# Patient Record
Sex: MALE | Race: WHITE | NOT HISPANIC OR LATINO | Employment: OTHER | URBAN - METROPOLITAN AREA
[De-identification: names, ages, dates, MRNs, and addresses within clinical notes are randomized per-mention and may not be internally consistent; named-entity substitution may affect disease eponyms.]

---

## 2020-08-07 ENCOUNTER — ANESTHESIA EVENT (OUTPATIENT)
Dept: SURGERY | Facility: HOSPITAL | Age: 75
DRG: 329 | End: 2020-08-07
Payer: MEDICARE

## 2020-08-07 ENCOUNTER — HOSPITAL ENCOUNTER (INPATIENT)
Facility: HOSPITAL | Age: 75
LOS: 6 days | Discharge: HOME | DRG: 329 | End: 2020-08-13
Attending: EMERGENCY MEDICINE | Admitting: SURGERY
Payer: MEDICARE

## 2020-08-07 ENCOUNTER — ANESTHESIA (OUTPATIENT)
Dept: SURGERY | Facility: HOSPITAL | Age: 75
DRG: 329 | End: 2020-08-07
Payer: MEDICARE

## 2020-08-07 DIAGNOSIS — K56.2 VOLVULUS OF SIGMOID COLON: Primary | ICD-10-CM

## 2020-08-07 DIAGNOSIS — Z01.818 PRE-OP TESTING: ICD-10-CM

## 2020-08-07 DIAGNOSIS — K56.699 DIVERTICULAR STRICTURE: ICD-10-CM

## 2020-08-07 DIAGNOSIS — Z93.3 COLOSTOMY IN PLACE: ICD-10-CM

## 2020-08-07 DIAGNOSIS — K57.92 DIVERTICULITIS: ICD-10-CM

## 2020-08-07 DIAGNOSIS — I10 ESSENTIAL HYPERTENSION: ICD-10-CM

## 2020-08-07 LAB
ABO + RH BLD: NORMAL
ALBUMIN SERPL BCP-MCNC: 4 G/DL (ref 3.5–5.2)
ALP SERPL-CCNC: 44 U/L (ref 55–135)
ALT SERPL W/O P-5'-P-CCNC: 13 U/L (ref 10–44)
ANION GAP SERPL CALC-SCNC: 11 MMOL/L (ref 8–16)
AST SERPL-CCNC: 27 U/L (ref 10–40)
BASOPHILS # BLD AUTO: 0.03 K/UL (ref 0–0.2)
BASOPHILS # BLD AUTO: 0.04 K/UL (ref 0–0.2)
BASOPHILS NFR BLD: 0.7 % (ref 0–1.9)
BASOPHILS NFR BLD: 0.7 % (ref 0–1.9)
BILIRUB SERPL-MCNC: 0.4 MG/DL (ref 0.1–1)
BILIRUB UR QL STRIP: NEGATIVE
BLD GP AB SCN CELLS X3 SERPL QL: NORMAL
BUN SERPL-MCNC: 9 MG/DL (ref 8–23)
CALCIUM SERPL-MCNC: 9.3 MG/DL (ref 8.7–10.5)
CHLORIDE SERPL-SCNC: 102 MMOL/L (ref 95–110)
CLARITY UR: CLEAR
CO2 SERPL-SCNC: 29 MMOL/L (ref 23–29)
COLOR UR: YELLOW
CREAT SERPL-MCNC: 0.9 MG/DL (ref 0.5–1.4)
DIFFERENTIAL METHOD: ABNORMAL
DIFFERENTIAL METHOD: ABNORMAL
EOSINOPHIL # BLD AUTO: 0.1 K/UL (ref 0–0.5)
EOSINOPHIL # BLD AUTO: 0.2 K/UL (ref 0–0.5)
EOSINOPHIL NFR BLD: 2.4 % (ref 0–8)
EOSINOPHIL NFR BLD: 2.6 % (ref 0–8)
ERYTHROCYTE [DISTWIDTH] IN BLOOD BY AUTOMATED COUNT: 14.1 % (ref 11.5–14.5)
ERYTHROCYTE [DISTWIDTH] IN BLOOD BY AUTOMATED COUNT: 14.3 % (ref 11.5–14.5)
EST. GFR  (AFRICAN AMERICAN): >60 ML/MIN/1.73 M^2
EST. GFR  (NON AFRICAN AMERICAN): >60 ML/MIN/1.73 M^2
GLUCOSE SERPL-MCNC: 99 MG/DL (ref 70–110)
GLUCOSE UR QL STRIP: NEGATIVE
HCT VFR BLD AUTO: 33.1 % (ref 40–54)
HCT VFR BLD AUTO: 35.7 % (ref 40–54)
HGB BLD-MCNC: 10.2 G/DL (ref 14–18)
HGB BLD-MCNC: 10.8 G/DL (ref 14–18)
HGB UR QL STRIP: ABNORMAL
IMM GRANULOCYTES # BLD AUTO: 0 K/UL (ref 0–0.04)
IMM GRANULOCYTES # BLD AUTO: 0.02 K/UL (ref 0–0.04)
IMM GRANULOCYTES NFR BLD AUTO: 0 % (ref 0–0.5)
IMM GRANULOCYTES NFR BLD AUTO: 0.3 % (ref 0–0.5)
KETONES UR QL STRIP: ABNORMAL
LEUKOCYTE ESTERASE UR QL STRIP: NEGATIVE
LIPASE SERPL-CCNC: 25 U/L (ref 4–60)
LYMPHOCYTES # BLD AUTO: 0.8 K/UL (ref 1–4.8)
LYMPHOCYTES # BLD AUTO: 0.8 K/UL (ref 1–4.8)
LYMPHOCYTES NFR BLD: 13.1 % (ref 18–48)
LYMPHOCYTES NFR BLD: 19.5 % (ref 18–48)
MCH RBC QN AUTO: 29.2 PG (ref 27–31)
MCH RBC QN AUTO: 29.4 PG (ref 27–31)
MCHC RBC AUTO-ENTMCNC: 30.3 G/DL (ref 32–36)
MCHC RBC AUTO-ENTMCNC: 30.8 G/DL (ref 32–36)
MCV RBC AUTO: 95 FL (ref 82–98)
MCV RBC AUTO: 97 FL (ref 82–98)
MONOCYTES # BLD AUTO: 0.1 K/UL (ref 0.3–1)
MONOCYTES # BLD AUTO: 0.3 K/UL (ref 0.3–1)
MONOCYTES NFR BLD: 2.7 % (ref 4–15)
MONOCYTES NFR BLD: 5.5 % (ref 4–15)
NEUTROPHILS # BLD AUTO: 3.1 K/UL (ref 1.8–7.7)
NEUTROPHILS # BLD AUTO: 4.6 K/UL (ref 1.8–7.7)
NEUTROPHILS NFR BLD: 74.7 % (ref 38–73)
NEUTROPHILS NFR BLD: 77.8 % (ref 38–73)
NITRITE UR QL STRIP: NEGATIVE
NRBC BLD-RTO: 0 /100 WBC
NRBC BLD-RTO: 0 /100 WBC
PH UR STRIP: >8 [PH] (ref 5–8)
PLATELET # BLD AUTO: 190 K/UL (ref 150–350)
PLATELET # BLD AUTO: 222 K/UL (ref 150–350)
PMV BLD AUTO: 10 FL (ref 9.2–12.9)
PMV BLD AUTO: 9.9 FL (ref 9.2–12.9)
POTASSIUM SERPL-SCNC: 3.5 MMOL/L (ref 3.5–5.1)
PROT SERPL-MCNC: 7.3 G/DL (ref 6–8.4)
PROT UR QL STRIP: ABNORMAL
RBC # BLD AUTO: 3.49 M/UL (ref 4.6–6.2)
RBC # BLD AUTO: 3.67 M/UL (ref 4.6–6.2)
SARS-COV-2 RDRP RESP QL NAA+PROBE: NEGATIVE
SODIUM SERPL-SCNC: 142 MMOL/L (ref 136–145)
SP GR UR STRIP: 1.01 (ref 1–1.03)
URN SPEC COLLECT METH UR: ABNORMAL
UROBILINOGEN UR STRIP-ACNC: NEGATIVE EU/DL
WBC # BLD AUTO: 4.1 K/UL (ref 3.9–12.7)
WBC # BLD AUTO: 5.86 K/UL (ref 3.9–12.7)

## 2020-08-07 PROCEDURE — 25000003 PHARM REV CODE 250: Performed by: EMERGENCY MEDICINE

## 2020-08-07 PROCEDURE — 36000708 HC OR TIME LEV III 1ST 15 MIN: Performed by: SURGERY

## 2020-08-07 PROCEDURE — 11000001 HC ACUTE MED/SURG PRIVATE ROOM

## 2020-08-07 PROCEDURE — 81003 URINALYSIS AUTO W/O SCOPE: CPT

## 2020-08-07 PROCEDURE — 25000003 PHARM REV CODE 250: Performed by: NURSE ANESTHETIST, CERTIFIED REGISTERED

## 2020-08-07 PROCEDURE — 96375 TX/PRO/DX INJ NEW DRUG ADDON: CPT

## 2020-08-07 PROCEDURE — 88307 TISSUE EXAM BY PATHOLOGIST: CPT | Performed by: PATHOLOGY

## 2020-08-07 PROCEDURE — 71000033 HC RECOVERY, INTIAL HOUR: Performed by: SURGERY

## 2020-08-07 PROCEDURE — 44139 MOBILIZATION OF COLON: CPT | Mod: ,,, | Performed by: SURGERY

## 2020-08-07 PROCEDURE — 25500020 PHARM REV CODE 255: Performed by: EMERGENCY MEDICINE

## 2020-08-07 PROCEDURE — C9290 INJ, BUPIVACAINE LIPOSOME: HCPCS | Performed by: SURGERY

## 2020-08-07 PROCEDURE — 88307 PR  SURG PATH,LEVEL V: ICD-10-PCS | Mod: 26,,, | Performed by: PATHOLOGY

## 2020-08-07 PROCEDURE — 83690 ASSAY OF LIPASE: CPT

## 2020-08-07 PROCEDURE — 63600175 PHARM REV CODE 636 W HCPCS: Performed by: SURGERY

## 2020-08-07 PROCEDURE — 93005 ELECTROCARDIOGRAM TRACING: CPT

## 2020-08-07 PROCEDURE — 85025 COMPLETE CBC W/AUTO DIFF WBC: CPT | Mod: 91

## 2020-08-07 PROCEDURE — 36415 COLL VENOUS BLD VENIPUNCTURE: CPT

## 2020-08-07 PROCEDURE — 63600175 PHARM REV CODE 636 W HCPCS: Performed by: ANESTHESIOLOGY

## 2020-08-07 PROCEDURE — 99285 EMERGENCY DEPT VISIT HI MDM: CPT | Mod: 25

## 2020-08-07 PROCEDURE — 88307 TISSUE EXAM BY PATHOLOGIST: CPT | Mod: 26,,, | Performed by: PATHOLOGY

## 2020-08-07 PROCEDURE — 44143 PARTIAL REMOVAL OF COLON: CPT | Mod: ,,, | Performed by: SURGERY

## 2020-08-07 PROCEDURE — 93010 ELECTROCARDIOGRAM REPORT: CPT | Mod: ,,, | Performed by: INTERNAL MEDICINE

## 2020-08-07 PROCEDURE — U0002 COVID-19 LAB TEST NON-CDC: HCPCS

## 2020-08-07 PROCEDURE — 99223 PR INITIAL HOSPITAL CARE,LEVL III: ICD-10-PCS | Mod: 57,AI,, | Performed by: SURGERY

## 2020-08-07 PROCEDURE — 63600175 PHARM REV CODE 636 W HCPCS: Performed by: NURSE ANESTHETIST, CERTIFIED REGISTERED

## 2020-08-07 PROCEDURE — 27201423 OPTIME MED/SURG SUP & DEVICES STERILE SUPPLY: Performed by: SURGERY

## 2020-08-07 PROCEDURE — 37000008 HC ANESTHESIA 1ST 15 MINUTES: Performed by: SURGERY

## 2020-08-07 PROCEDURE — C1765 ADHESION BARRIER: HCPCS | Performed by: SURGERY

## 2020-08-07 PROCEDURE — 25000003 PHARM REV CODE 250: Performed by: SURGERY

## 2020-08-07 PROCEDURE — 86901 BLOOD TYPING SEROLOGIC RH(D): CPT

## 2020-08-07 PROCEDURE — 86920 COMPATIBILITY TEST SPIN: CPT

## 2020-08-07 PROCEDURE — 96374 THER/PROPH/DIAG INJ IV PUSH: CPT

## 2020-08-07 PROCEDURE — 44139 PR MOBILIZE SPLENIC FLEX: ICD-10-PCS | Mod: ,,, | Performed by: SURGERY

## 2020-08-07 PROCEDURE — 99223 1ST HOSP IP/OBS HIGH 75: CPT | Mod: 57,AI,, | Performed by: SURGERY

## 2020-08-07 PROCEDURE — 93010 EKG 12-LEAD: ICD-10-PCS | Mod: ,,, | Performed by: INTERNAL MEDICINE

## 2020-08-07 PROCEDURE — 36000709 HC OR TIME LEV III EA ADD 15 MIN: Performed by: SURGERY

## 2020-08-07 PROCEDURE — 80053 COMPREHEN METABOLIC PANEL: CPT

## 2020-08-07 PROCEDURE — S0030 INJECTION, METRONIDAZOLE: HCPCS | Performed by: EMERGENCY MEDICINE

## 2020-08-07 PROCEDURE — 37000009 HC ANESTHESIA EA ADD 15 MINS: Performed by: SURGERY

## 2020-08-07 PROCEDURE — 63600175 PHARM REV CODE 636 W HCPCS: Performed by: EMERGENCY MEDICINE

## 2020-08-07 PROCEDURE — 44143 PR PART REMOVAL COLON W END COLOSTOMY: ICD-10-PCS | Mod: ,,, | Performed by: SURGERY

## 2020-08-07 DEVICE — MEMBRANE SEPRAFILM 5 X 6: Type: IMPLANTABLE DEVICE | Site: ABDOMEN | Status: FUNCTIONAL

## 2020-08-07 RX ORDER — DEXAMETHASONE SODIUM PHOSPHATE 4 MG/ML
INJECTION, SOLUTION INTRA-ARTICULAR; INTRALESIONAL; INTRAMUSCULAR; INTRAVENOUS; SOFT TISSUE
Status: DISCONTINUED | OUTPATIENT
Start: 2020-08-07 | End: 2020-08-07

## 2020-08-07 RX ORDER — OXYCODONE AND ACETAMINOPHEN 10; 325 MG/1; MG/1
TABLET ORAL
Status: ON HOLD | COMMUNITY
Start: 2020-07-24 | End: 2020-08-13 | Stop reason: HOSPADM

## 2020-08-07 RX ORDER — CIPROFLOXACIN 2 MG/ML
400 INJECTION, SOLUTION INTRAVENOUS
Status: COMPLETED | OUTPATIENT
Start: 2020-08-07 | End: 2020-08-07

## 2020-08-07 RX ORDER — SUCCINYLCHOLINE CHLORIDE 20 MG/ML
INJECTION INTRAMUSCULAR; INTRAVENOUS
Status: DISCONTINUED | OUTPATIENT
Start: 2020-08-07 | End: 2020-08-07

## 2020-08-07 RX ORDER — ROCURONIUM BROMIDE 10 MG/ML
INJECTION, SOLUTION INTRAVENOUS
Status: DISCONTINUED | OUTPATIENT
Start: 2020-08-07 | End: 2020-08-07

## 2020-08-07 RX ORDER — ONDANSETRON 2 MG/ML
4 INJECTION INTRAMUSCULAR; INTRAVENOUS DAILY PRN
Status: DISCONTINUED | OUTPATIENT
Start: 2020-08-07 | End: 2020-08-08 | Stop reason: HOSPADM

## 2020-08-07 RX ORDER — TIZANIDINE 4 MG/1
TABLET ORAL
Status: ON HOLD | COMMUNITY
Start: 2020-07-24 | End: 2020-08-13 | Stop reason: HOSPADM

## 2020-08-07 RX ORDER — ONDANSETRON 2 MG/ML
INJECTION INTRAMUSCULAR; INTRAVENOUS
Status: DISCONTINUED | OUTPATIENT
Start: 2020-08-07 | End: 2020-08-07

## 2020-08-07 RX ORDER — FENTANYL CITRATE 50 UG/ML
INJECTION, SOLUTION INTRAMUSCULAR; INTRAVENOUS
Status: DISCONTINUED | OUTPATIENT
Start: 2020-08-07 | End: 2020-08-07

## 2020-08-07 RX ORDER — HYDROMORPHONE HYDROCHLORIDE 2 MG/ML
0.2 INJECTION, SOLUTION INTRAMUSCULAR; INTRAVENOUS; SUBCUTANEOUS EVERY 5 MIN PRN
Status: DISCONTINUED | OUTPATIENT
Start: 2020-08-07 | End: 2020-08-08 | Stop reason: HOSPADM

## 2020-08-07 RX ORDER — METRONIDAZOLE 500 MG/100ML
500 INJECTION, SOLUTION INTRAVENOUS
Status: COMPLETED | OUTPATIENT
Start: 2020-08-07 | End: 2020-08-07

## 2020-08-07 RX ORDER — MORPHINE SULFATE 4 MG/ML
2 INJECTION, SOLUTION INTRAMUSCULAR; INTRAVENOUS
Status: COMPLETED | OUTPATIENT
Start: 2020-08-07 | End: 2020-08-07

## 2020-08-07 RX ORDER — KETOROLAC TROMETHAMINE 30 MG/ML
10 INJECTION, SOLUTION INTRAMUSCULAR; INTRAVENOUS
Status: COMPLETED | OUTPATIENT
Start: 2020-08-07 | End: 2020-08-07

## 2020-08-07 RX ORDER — METRONIDAZOLE 500 MG/100ML
500 INJECTION, SOLUTION INTRAVENOUS ONCE
Status: CANCELLED | OUTPATIENT
Start: 2020-08-07 | End: 2020-08-07

## 2020-08-07 RX ORDER — ONDANSETRON 2 MG/ML
4 INJECTION INTRAMUSCULAR; INTRAVENOUS
Status: COMPLETED | OUTPATIENT
Start: 2020-08-07 | End: 2020-08-07

## 2020-08-07 RX ORDER — NEOSTIGMINE METHYLSULFATE 1 MG/ML
INJECTION, SOLUTION INTRAVENOUS
Status: DISCONTINUED | OUTPATIENT
Start: 2020-08-07 | End: 2020-08-07

## 2020-08-07 RX ORDER — GLYCOPYRROLATE 0.2 MG/ML
INJECTION INTRAMUSCULAR; INTRAVENOUS
Status: DISCONTINUED | OUTPATIENT
Start: 2020-08-07 | End: 2020-08-07

## 2020-08-07 RX ORDER — LIDOCAINE HYDROCHLORIDE 10 MG/ML
INJECTION, SOLUTION EPIDURAL; INFILTRATION; INTRACAUDAL; PERINEURAL
Status: DISCONTINUED | OUTPATIENT
Start: 2020-08-07 | End: 2020-08-07

## 2020-08-07 RX ORDER — GABAPENTIN 300 MG/1
CAPSULE ORAL
Status: ON HOLD | COMMUNITY
Start: 2020-07-24 | End: 2020-08-13 | Stop reason: HOSPADM

## 2020-08-07 RX ORDER — SODIUM CHLORIDE, SODIUM LACTATE, POTASSIUM CHLORIDE, CALCIUM CHLORIDE 600; 310; 30; 20 MG/100ML; MG/100ML; MG/100ML; MG/100ML
INJECTION, SOLUTION INTRAVENOUS CONTINUOUS PRN
Status: DISCONTINUED | OUTPATIENT
Start: 2020-08-07 | End: 2020-08-07

## 2020-08-07 RX ORDER — ALENDRONATE SODIUM 70 MG/1
TABLET ORAL
COMMUNITY
Start: 2013-04-18

## 2020-08-07 RX ORDER — PROPOFOL 10 MG/ML
VIAL (ML) INTRAVENOUS
Status: DISCONTINUED | OUTPATIENT
Start: 2020-08-07 | End: 2020-08-07

## 2020-08-07 RX ORDER — OMEPRAZOLE 40 MG/1
40 CAPSULE, DELAYED RELEASE ORAL
Status: ON HOLD | COMMUNITY
Start: 2013-04-23 | End: 2020-08-13 | Stop reason: HOSPADM

## 2020-08-07 RX ORDER — BUPIVACAINE HYDROCHLORIDE 2.5 MG/ML
INJECTION, SOLUTION EPIDURAL; INFILTRATION; INTRACAUDAL
Status: DISCONTINUED | OUTPATIENT
Start: 2020-08-07 | End: 2020-08-07 | Stop reason: HOSPADM

## 2020-08-07 RX ADMIN — ONDANSETRON 4 MG: 2 INJECTION INTRAMUSCULAR; INTRAVENOUS at 05:08

## 2020-08-07 RX ADMIN — LIDOCAINE HYDROCHLORIDE 50 MG: 10 INJECTION, SOLUTION EPIDURAL; INFILTRATION; INTRACAUDAL; PERINEURAL at 09:08

## 2020-08-07 RX ADMIN — ONDANSETRON 4 MG: 2 INJECTION, SOLUTION INTRAMUSCULAR; INTRAVENOUS at 09:08

## 2020-08-07 RX ADMIN — FENTANYL CITRATE 50 MCG: 50 INJECTION, SOLUTION INTRAMUSCULAR; INTRAVENOUS at 09:08

## 2020-08-07 RX ADMIN — MORPHINE SULFATE 2 MG: 4 INJECTION, SOLUTION INTRAMUSCULAR; INTRAVENOUS at 05:08

## 2020-08-07 RX ADMIN — KETOROLAC TROMETHAMINE 10 MG: 30 INJECTION, SOLUTION INTRAMUSCULAR at 04:08

## 2020-08-07 RX ADMIN — IOHEXOL 100 ML: 350 INJECTION, SOLUTION INTRAVENOUS at 04:08

## 2020-08-07 RX ADMIN — HYDROMORPHONE HYDROCHLORIDE 0.2 MG: 2 INJECTION INTRAMUSCULAR; INTRAVENOUS; SUBCUTANEOUS at 11:08

## 2020-08-07 RX ADMIN — ROBINUL 0.2 MG: 0.2 INJECTION INTRAMUSCULAR; INTRAVENOUS at 09:08

## 2020-08-07 RX ADMIN — CIPROFLOXACIN 400 MG: 2 SOLUTION, CONCENTRATE INTRAVENOUS at 09:08

## 2020-08-07 RX ADMIN — SUCCINYLCHOLINE CHLORIDE 120 MG: 20 INJECTION, SOLUTION INTRAMUSCULAR; INTRAVENOUS at 09:08

## 2020-08-07 RX ADMIN — BUPIVACAINE 266 MG: 13.3 INJECTION, SUSPENSION, LIPOSOMAL INFILTRATION at 10:08

## 2020-08-07 RX ADMIN — SODIUM CHLORIDE, SODIUM LACTATE, POTASSIUM CHLORIDE, AND CALCIUM CHLORIDE: .6; .31; .03; .02 INJECTION, SOLUTION INTRAVENOUS at 08:08

## 2020-08-07 RX ADMIN — PROPOFOL 100 MG: 10 INJECTION, EMULSION INTRAVENOUS at 09:08

## 2020-08-07 RX ADMIN — ROBINUL 0.8 MG: 0.2 INJECTION INTRAMUSCULAR; INTRAVENOUS at 10:08

## 2020-08-07 RX ADMIN — DEXAMETHASONE SODIUM PHOSPHATE 4 MG: 4 INJECTION, SOLUTION INTRA-ARTICULAR; INTRALESIONAL; INTRAMUSCULAR; INTRAVENOUS; SOFT TISSUE at 09:08

## 2020-08-07 RX ADMIN — NEOSTIGMINE METHYLSULFATE 4 MG: 1 INJECTION INTRAVENOUS at 10:08

## 2020-08-07 RX ADMIN — SODIUM CHLORIDE, SODIUM LACTATE, POTASSIUM CHLORIDE, AND CALCIUM CHLORIDE: .6; .31; .03; .02 INJECTION, SOLUTION INTRAVENOUS at 10:08

## 2020-08-07 RX ADMIN — ROCURONIUM BROMIDE 10 MG: 10 INJECTION, SOLUTION INTRAVENOUS at 09:08

## 2020-08-07 RX ADMIN — METRONIDAZOLE 500 MG: 500 INJECTION, SOLUTION INTRAVENOUS at 07:08

## 2020-08-07 RX ADMIN — FENTANYL CITRATE 100 MCG: 50 INJECTION, SOLUTION INTRAMUSCULAR; INTRAVENOUS at 10:08

## 2020-08-07 NOTE — ED PROVIDER NOTES
"SCRIBE #1 NOTE: I, Lisa Kang, am scribing for, and in the presence of, Bjorn Macedo MD. I have scribed the entire note.       History     Chief Complaint   Patient presents with    Abdominal Pain     worsening abd cramping after a colooscopy and barium enema     Review of patient's allergies indicates:  No Known Allergies      History of Present Illness     HPI    8/7/2020, 2:23 PM  History obtained from the patient      History of Present Illness: Henrique Vargas Jr. is a 74 y.o. male patient with a h/o diverticulitis, prostate cancer, who presents to the Emergency Department for evaluation of LLQ abd pain which onset this morning. Pt reports that he has had abd cramping following an XR barium enema done this morning at [facility]. Symptoms are constant and "8/10" in severity. No mitigating or exacerbating factors reported. Associated sxs include abd cramping and nausea. Patient denies any fever, emesis, diarrhea, SOB, dysuria, and all other sxs at this time. No prior Tx reported. No further complaints or concerns at this time.       Arrival mode: Personal transportation      PCP: Bjorn Warner MD      Past Medical History:  Past Medical History:   Diagnosis Date    Diverticulitis     Prostate cancer        Past Surgical History:  Past Surgical History:   Procedure Laterality Date    APPENDECTOMY      BACK SURGERY      PROSTATECTOMY           Family History:  History reviewed. No pertinent family history.    Social History:   Social History     Tobacco Use    Smoking status: Never Smoker   Substance and Sexual Activity    Alcohol use: No    Drug use: No    Sexual activity: Unknown        Review of Systems     Review of Systems   Constitutional: Negative for fever.   HENT: Negative for sore throat.    Respiratory: Negative for shortness of breath.    Cardiovascular: Negative for chest pain.   Gastrointestinal: Positive for abdominal pain (LLQ) and nausea. Negative for diarrhea " "and vomiting.        (+) abd cramping   Genitourinary: Negative for dysuria.   Musculoskeletal: Negative for back pain.   Skin: Negative for rash.   Neurological: Negative for headaches.   Hematological: Does not bruise/bleed easily.   All other systems reviewed and are negative.       Physical Exam     Initial Vitals [08/07/20 1413]   BP Pulse Resp Temp SpO2   (!) 175/85 (!) 49 18 97.7 °F (36.5 °C) 95 %      MAP       --          Physical Exam  Nursing Notes and Vital Signs Reviewed.  Constitutional: Well-developed and well-nourished.   Head: Atraumatic. Normocephalic.  Eyes: EOM intact. No scleral icterus.  ENT: Mucous membranes are moist. Oropharynx is clear and symmetric.    Neck: Supple. Full ROM. No lymphadenopathy.  Cardiovascular: Bradycardic. Regular rhythm. No murmurs, rubs, or gallops. Distal pulses are 2+ and symmetric.  Pulmonary/Chest: No respiratory distress. Clear to auscultation bilaterally. No wheezing or rales.  Abdominal: Soft and non-distended. Mild LLQ tenderness.  No rebound, guarding, or rigidity. Good bowel sounds.  Genitourinary: No CVA tenderness  Musculoskeletal: Moves all extremities. No obvious deformities. No calf tenderness.  Skin: Warm and dry.  Neurological:  Alert, awake, and appropriate.  Normal speech.  No acute focal neurological deficits are appreciated.  Psychiatric: Normal affect. Good eye contact. Appropriate in content.     ED Course   Procedures  ED Vital Signs:  Vitals:    08/07/20 1413 08/07/20 1430 08/07/20 1530 08/07/20 1605   BP: (!) 175/85 (!) 153/74 (!) 149/67 (!) 156/73   Pulse: (!) 49 (!) 42 (!) 40 (!) 39   Resp: 18 16 16 16   Temp: 97.7 °F (36.5 °C)      TempSrc: Oral      SpO2: 95% 96% 97% 98%   Weight: 59.9 kg (132 lb 0.9 oz)      Height: 5' 8" (1.727 m)       08/07/20 1730 08/07/20 1743 08/07/20 1903   BP: (!) 156/67  119/62   Pulse: (!) 43  (!) 38   Resp: 16 16 14   Temp:      TempSrc:      SpO2: 97%  95%   Weight:      Height:          Abnormal Lab " Results:  Labs Reviewed   CBC W/ AUTO DIFFERENTIAL - Abnormal; Notable for the following components:       Result Value    RBC 3.49 (*)     Hemoglobin 10.2 (*)     Hematocrit 33.1 (*)     Mean Corpuscular Hemoglobin Conc 30.8 (*)     Lymph # 0.8 (*)     Gran% 77.8 (*)     Lymph% 13.1 (*)     All other components within normal limits   COMPREHENSIVE METABOLIC PANEL - Abnormal; Notable for the following components:    Alkaline Phosphatase 44 (*)     All other components within normal limits   URINALYSIS, REFLEX TO URINE CULTURE - Abnormal; Notable for the following components:    pH, UA >8.0 (*)     Protein, UA Trace (*)     Ketones, UA 1+ (*)     Occult Blood UA Trace (*)     All other components within normal limits    Narrative:     Specimen Source->Urine   LIPASE   SARS-COV-2 RNA AMPLIFICATION, QUAL   TYPE & SCREEN        All Lab Results:  Results for orders placed or performed during the hospital encounter of 08/07/20   CBC W/ AUTO DIFFERENTIAL   Result Value Ref Range    WBC 5.86 3.90 - 12.70 K/uL    RBC 3.49 (L) 4.60 - 6.20 M/uL    Hemoglobin 10.2 (L) 14.0 - 18.0 g/dL    Hematocrit 33.1 (L) 40.0 - 54.0 %    Mean Corpuscular Volume 95 82 - 98 fL    Mean Corpuscular Hemoglobin 29.2 27.0 - 31.0 pg    Mean Corpuscular Hemoglobin Conc 30.8 (L) 32.0 - 36.0 g/dL    RDW 14.1 11.5 - 14.5 %    Platelets 190 150 - 350 K/uL    MPV 9.9 9.2 - 12.9 fL    Immature Granulocytes 0.3 0.0 - 0.5 %    Gran # (ANC) 4.6 1.8 - 7.7 K/uL    Immature Grans (Abs) 0.02 0.00 - 0.04 K/uL    Lymph # 0.8 (L) 1.0 - 4.8 K/uL    Mono # 0.3 0.3 - 1.0 K/uL    Eos # 0.2 0.0 - 0.5 K/uL    Baso # 0.04 0.00 - 0.20 K/uL    nRBC 0 0 /100 WBC    Gran% 77.8 (H) 38.0 - 73.0 %    Lymph% 13.1 (L) 18.0 - 48.0 %    Mono% 5.5 4.0 - 15.0 %    Eosinophil% 2.6 0.0 - 8.0 %    Basophil% 0.7 0.0 - 1.9 %    Differential Method Automated    Comp. Metabolic Panel   Result Value Ref Range    Sodium 142 136 - 145 mmol/L    Potassium 3.5 3.5 - 5.1 mmol/L    Chloride 102 95 -  110 mmol/L    CO2 29 23 - 29 mmol/L    Glucose 99 70 - 110 mg/dL    BUN, Bld 9 8 - 23 mg/dL    Creatinine 0.9 0.5 - 1.4 mg/dL    Calcium 9.3 8.7 - 10.5 mg/dL    Total Protein 7.3 6.0 - 8.4 g/dL    Albumin 4.0 3.5 - 5.2 g/dL    Total Bilirubin 0.4 0.1 - 1.0 mg/dL    Alkaline Phosphatase 44 (L) 55 - 135 U/L    AST 27 10 - 40 U/L    ALT 13 10 - 44 U/L    Anion Gap 11 8 - 16 mmol/L    eGFR if African American >60 >60 mL/min/1.73 m^2    eGFR if non African American >60 >60 mL/min/1.73 m^2   Lipase   Result Value Ref Range    Lipase 25 4 - 60 U/L   Urinalysis, Reflex to Urine Culture Urine, Clean Catch    Specimen: Urine   Result Value Ref Range    Specimen UA Urine, Clean Catch     Color, UA Yellow Yellow, Straw, Isa    Appearance, UA Clear Clear    pH, UA >8.0 (A) 5.0 - 8.0    Specific Gravity, UA 1.010 1.005 - 1.030    Protein, UA Trace (A) Negative    Glucose, UA Negative Negative    Ketones, UA 1+ (A) Negative    Bilirubin (UA) Negative Negative    Occult Blood UA Trace (A) Negative    Nitrite, UA Negative Negative    Urobilinogen, UA Negative <2.0 EU/dL    Leukocytes, UA Negative Negative         Imaging Results          CT Abdomen Pelvis With Contrast (Final result)  Result time 08/07/20 17:00:38    Final result by Onofre Leonard MD (08/07/20 17:00:38)                 Impression:      There is partial twisting of the sigmoid mesentery at the left lower quadrant suggesting early or developing sigmoid volvulus. There is also wall thickening, luminal narrowing, and mild inflammation of the lower sigmoid colon distal to the twisting, suggesting mild to moderate diverticulitis (comparison with prior study of 10/09/2015 suggests this may be a chronic or recurrent diverticulitis).  Whether the gaseous distension of the right colon, transverse colon, and descending colon is related to the partial twisting of the sigmoid mesentery, or to the sigmoid diverticulitis, is uncertain.  No abscess or free air.  No  pneumatosis.    This critical information above was relayed by myself by telephone to Dr. Macedo on 08/07/2020 at 16:52.      Electronically signed by: Onofre Leonard  Date:    08/07/2020  Time:    17:00             Narrative:    EXAMINATION:  CT ABDOMEN PELVIS WITH CONTRAST    CLINICAL HISTORY:  Abdominal pain, acute, nonlocalized;    TECHNIQUE:  Low dose axial images, sagittal and coronal reformations were obtained from the lung bases to the pubic symphysis after  mL Omnipaque 350. Oral Omnipaque 350 also administered.    All CT scans at this location are performed using dose modulation techniques as appropriate to a performed exam including the following: Automated exposure control; adjustment of the mA and/or kV according to patient size.    COMPARISON:  10/09/2015 CT abdomen pelvis    FINDINGS:  Heart: Normal in size. No pericardial effusion.    Lung Bases: Well aerated, without consolidation or pleural fluid.    Liver: Normal in size and attenuation, with no focal hepatic lesions.    Gallbladder: No calcified gallstones.    Bile Ducts: No evidence of dilated ducts.    Pancreas: Mild pancreatic atrophy.    Spleen: Unremarkable.    Adrenals: Unremarkable.    Kidneys/ Ureters: Nonobstructing left upper pole 3 mm renal stone.  No hydronephrosis.    Bladder: No evidence of wall thickening.    Reproductive organs: Evidence of prior prostatectomy.  Surgical clips in the pelvis.  Small volume free fluid also seen in the pelvis.    GI Tract/Mesentery: Large hiatal hernia containing the body and fundus of the stomach.  Duodenal diverticulum again noted..  Small bowel unremarkable.  Normal transit of oral contrast.  Gaseous distention of the right colon, transverse colon, descending colon up to 6.8 cm.  There is partial twisting of the sigmoid mesentery at the left lower quadrant suggesting early or developing sigmoid volvulus.  There is also wall thickening and mild inflammation of the sigmoid colon suggesting  mild to moderate diverticulitis, with mild luminal narrowing.  Whether the colonic distension is related to the partial twisting of the sigmoid mesentery or the sigmoid diverticulitis is uncertain.  No abscess or free air.  No pneumatosis.    Appendix: Appendix is not well visualized.  No evidence of appendicitis.    Peritoneal Space: No free air.    Retroperitoneum: No significant adenopathy.    Abdominal wall: Unremarkable.    Vasculature: Aortoiliac  atherosclerotic calcification. No aneurysm.    Bones: Extensive thoracolumbar fixation hardware.  Lumbar neurostimulator device.  Bones appear osteopenic.                                   The Emergency Provider reviewed the vital signs and test results, which are outlined above.     ED Discussion     5:15 PM: Discussed pt's case with Dr. Bee (Gastroenterology) who recommends pt have GI consult at [facility] and does not think pt needs to be transferred.    5:39 PM: Discussed pt's case with Dr. Tiwari (Gastroenterology) who recommends consulting surgery because she does not want to scope secondary to diverticulitis.     5:55 PM: Discussed pt's case with Dr. Will (General Surgery) who states that he will come see pt and reports that pt is going to need a decompressive colonoscopy by gastroenterology or surgery.    6:33 PM: Discussed case with Dr. Will (General Surgery). Dr. Will agrees with current care and management of pt and accepts admission.   Admitting Service: General Surgery  Admit to: Inpatient    Re-evaluated pt. I have discussed test results, shared treatment plan, and the need for admission with patient and family at bedside. Pt and family express understanding at this time and agree with all information. All questions answered. Pt and family have no further questions or concerns at this time. Pt is ready for admit.       MDM        Medical Decision Making:   Clinical Tests:   Lab Tests: Ordered and Reviewed  Radiological Study: Ordered  and Reviewed           ED Medication(s):  Medications   ciprofloxacin (CIPRO)400mg/200ml D5W IVPB 400 mg (0 mg Intravenous Paused 8/7/20 1918)   metronidazole IVPB 500 mg (500 mg Intravenous New Bag 8/7/20 1919)   iohexoL (OMNIPAQUE 9) oral solution 500 mL (500 mLs Oral Given 8/7/20 1625)   iohexoL (OMNIPAQUE 350) injection 100 mL (100 mLs Intravenous Given 8/7/20 1622)   ketorolac injection 9.999 mg (9.999 mg Intravenous Given 8/7/20 1639)   morphine injection 2 mg (2 mg Intravenous Given 8/7/20 1743)   ondansetron injection 4 mg (4 mg Intravenous Given 8/7/20 1740)       New Prescriptions    No medications on file               Scribe Attestation:   Scribe #1: I performed the above scribed service and the documentation accurately describes the services I performed. I attest to the accuracy of the note.     Attending:   Physician Attestation Statement for Scribe #1: I, Bjorn Macedo MD, personally performed the services described in this documentation, as scribed by Lisa Kang, in my presence, and it is both accurate and complete.           Clinical Impression       ICD-10-CM ICD-9-CM   1. Volvulus of sigmoid colon  K56.2 560.2   2. Pre-op testing  Z01.818 V72.84   3. Diverticulitis  K57.92 562.11       Disposition:   Disposition: Admitted  Condition: Fair         Bjorn Macedo MD  08/07/20 1953

## 2020-08-07 NOTE — ED NOTES
Pt c/o left sided abdominal pain/cramping following colonoscopy this morning. Pain rated 9/10. Denies fever, n/v, CP, SOB, HA, numbness, tingling, vision changes, bloody stool or any other symptoms at this time.     Patient identifiers verified and correct for Henrique Vargas Jr..    LOC: The patient is awake, alert and aware of environment with an appropriate affect, the patient is oriented x 3 and speaking appropriately.  APPEARANCE: Patient resting comfortably and in no acute distress, patient is clean and well groomed, patient's clothing is properly fastened.  SKIN: The skin is warm and dry, color consistent with ethnicity, patient has normal skin turgor and moist mucus membranes, skin intact, no breakdown or bruising noted.  MUSCULOSKELETAL: Patient moving all extremities spontaneously, generalized weakness.   RESPIRATORY: Airway is open and patent, respirations are spontaneous.  CARDIAC: Patient has a bradycardic - baseline, no peripheral edema noted, capillary refill < 3 seconds.  ABDOMEN: Soft and non tender to palpation.

## 2020-08-07 NOTE — ED NOTES
Brought patient to restroom, he was unable to provide urine sample at this time. Urinal at bedside.      Genevieve Stafford, Patient Care Assistant  08/07/20 0830

## 2020-08-07 NOTE — ED NOTES
Pt AAOx3, resting in bed, side rails up x 2, call bell within reach. NAD at this time. C/o 8/10 abdominal pain. MD notified. Will continue to monitor.

## 2020-08-08 PROBLEM — I10 ESSENTIAL HYPERTENSION: Status: ACTIVE | Noted: 2020-08-08

## 2020-08-08 LAB
ANION GAP SERPL CALC-SCNC: 8 MMOL/L (ref 8–16)
ANISOCYTOSIS BLD QL SMEAR: SLIGHT
ANISOCYTOSIS BLD QL SMEAR: SLIGHT
BASOPHILS # BLD AUTO: 0 K/UL (ref 0–0.2)
BASOPHILS # BLD AUTO: 0.02 K/UL (ref 0–0.2)
BASOPHILS NFR BLD: 0 % (ref 0–1.9)
BASOPHILS NFR BLD: 0.3 % (ref 0–1.9)
BUN SERPL-MCNC: 11 MG/DL (ref 8–23)
CALCIUM SERPL-MCNC: 8 MG/DL (ref 8.7–10.5)
CHLORIDE SERPL-SCNC: 105 MMOL/L (ref 95–110)
CO2 SERPL-SCNC: 27 MMOL/L (ref 23–29)
CREAT SERPL-MCNC: 0.9 MG/DL (ref 0.5–1.4)
DIFFERENTIAL METHOD: ABNORMAL
DIFFERENTIAL METHOD: ABNORMAL
EOSINOPHIL # BLD AUTO: 0 K/UL (ref 0–0.5)
EOSINOPHIL # BLD AUTO: 0 K/UL (ref 0–0.5)
EOSINOPHIL NFR BLD: 0 % (ref 0–8)
EOSINOPHIL NFR BLD: 0 % (ref 0–8)
ERYTHROCYTE [DISTWIDTH] IN BLOOD BY AUTOMATED COUNT: 14.3 % (ref 11.5–14.5)
ERYTHROCYTE [DISTWIDTH] IN BLOOD BY AUTOMATED COUNT: 14.6 % (ref 11.5–14.5)
EST. GFR  (AFRICAN AMERICAN): >60 ML/MIN/1.73 M^2
EST. GFR  (NON AFRICAN AMERICAN): >60 ML/MIN/1.73 M^2
GLUCOSE SERPL-MCNC: 88 MG/DL (ref 70–110)
HCT VFR BLD AUTO: 25.8 % (ref 40–54)
HCT VFR BLD AUTO: 30.8 % (ref 40–54)
HGB BLD-MCNC: 7.8 G/DL (ref 14–18)
HGB BLD-MCNC: 9.4 G/DL (ref 14–18)
HYPOCHROMIA BLD QL SMEAR: ABNORMAL
HYPOCHROMIA BLD QL SMEAR: ABNORMAL
IMM GRANULOCYTES # BLD AUTO: 0.01 K/UL (ref 0–0.04)
IMM GRANULOCYTES # BLD AUTO: 0.06 K/UL (ref 0–0.04)
IMM GRANULOCYTES NFR BLD AUTO: 0.3 % (ref 0–0.5)
IMM GRANULOCYTES NFR BLD AUTO: 1 % (ref 0–0.5)
LYMPHOCYTES # BLD AUTO: 0.2 K/UL (ref 1–4.8)
LYMPHOCYTES # BLD AUTO: 0.4 K/UL (ref 1–4.8)
LYMPHOCYTES NFR BLD: 6.7 % (ref 18–48)
LYMPHOCYTES NFR BLD: 8.2 % (ref 18–48)
MCH RBC QN AUTO: 29.1 PG (ref 27–31)
MCH RBC QN AUTO: 29.4 PG (ref 27–31)
MCHC RBC AUTO-ENTMCNC: 30.2 G/DL (ref 32–36)
MCHC RBC AUTO-ENTMCNC: 30.5 G/DL (ref 32–36)
MCV RBC AUTO: 95 FL (ref 82–98)
MCV RBC AUTO: 97 FL (ref 82–98)
MONOCYTES # BLD AUTO: 0.2 K/UL (ref 0.3–1)
MONOCYTES # BLD AUTO: 0.3 K/UL (ref 0.3–1)
MONOCYTES NFR BLD: 3.2 % (ref 4–15)
MONOCYTES NFR BLD: 8.5 % (ref 4–15)
NEUTROPHILS # BLD AUTO: 2.4 K/UL (ref 1.8–7.7)
NEUTROPHILS # BLD AUTO: 5.2 K/UL (ref 1.8–7.7)
NEUTROPHILS NFR BLD: 83 % (ref 38–73)
NEUTROPHILS NFR BLD: 88.8 % (ref 38–73)
NRBC BLD-RTO: 0 /100 WBC
NRBC BLD-RTO: 0 /100 WBC
OVALOCYTES BLD QL SMEAR: ABNORMAL
OVALOCYTES BLD QL SMEAR: ABNORMAL
PLATELET # BLD AUTO: 135 K/UL (ref 150–350)
PLATELET # BLD AUTO: 158 K/UL (ref 150–350)
PLATELET BLD QL SMEAR: ABNORMAL
PLATELET BLD QL SMEAR: ABNORMAL
PMV BLD AUTO: 10.1 FL (ref 9.2–12.9)
PMV BLD AUTO: 9.9 FL (ref 9.2–12.9)
POIKILOCYTOSIS BLD QL SMEAR: SLIGHT
POIKILOCYTOSIS BLD QL SMEAR: SLIGHT
POTASSIUM SERPL-SCNC: 3.5 MMOL/L (ref 3.5–5.1)
RBC # BLD AUTO: 2.65 M/UL (ref 4.6–6.2)
RBC # BLD AUTO: 3.23 M/UL (ref 4.6–6.2)
SODIUM SERPL-SCNC: 140 MMOL/L (ref 136–145)
WBC # BLD AUTO: 2.93 K/UL (ref 3.9–12.7)
WBC # BLD AUTO: 5.85 K/UL (ref 3.9–12.7)

## 2020-08-08 PROCEDURE — 94761 N-INVAS EAR/PLS OXIMETRY MLT: CPT

## 2020-08-08 PROCEDURE — S0030 INJECTION, METRONIDAZOLE: HCPCS | Performed by: SURGERY

## 2020-08-08 PROCEDURE — 36415 COLL VENOUS BLD VENIPUNCTURE: CPT

## 2020-08-08 PROCEDURE — 80048 BASIC METABOLIC PNL TOTAL CA: CPT

## 2020-08-08 PROCEDURE — 97161 PT EVAL LOW COMPLEX 20 MIN: CPT

## 2020-08-08 PROCEDURE — 97110 THERAPEUTIC EXERCISES: CPT

## 2020-08-08 PROCEDURE — 94799 UNLISTED PULMONARY SVC/PX: CPT

## 2020-08-08 PROCEDURE — 97530 THERAPEUTIC ACTIVITIES: CPT

## 2020-08-08 PROCEDURE — 25000003 PHARM REV CODE 250: Performed by: SURGERY

## 2020-08-08 PROCEDURE — 99223 PR INITIAL HOSPITAL CARE,LEVL III: ICD-10-PCS | Mod: ,,, | Performed by: INTERNAL MEDICINE

## 2020-08-08 PROCEDURE — 11000001 HC ACUTE MED/SURG PRIVATE ROOM

## 2020-08-08 PROCEDURE — 63600175 PHARM REV CODE 636 W HCPCS: Performed by: SURGERY

## 2020-08-08 PROCEDURE — 85025 COMPLETE CBC W/AUTO DIFF WBC: CPT | Mod: 91

## 2020-08-08 PROCEDURE — 27000221 HC OXYGEN, UP TO 24 HOURS

## 2020-08-08 PROCEDURE — 99223 1ST HOSP IP/OBS HIGH 75: CPT | Mod: ,,, | Performed by: INTERNAL MEDICINE

## 2020-08-08 RX ORDER — HYDROMORPHONE HYDROCHLORIDE 1 MG/ML
0.5 INJECTION, SOLUTION INTRAMUSCULAR; INTRAVENOUS; SUBCUTANEOUS
Status: DISCONTINUED | OUTPATIENT
Start: 2020-08-08 | End: 2020-08-10

## 2020-08-08 RX ORDER — TIZANIDINE 4 MG/1
4 TABLET ORAL EVERY 8 HOURS
Status: DISCONTINUED | OUTPATIENT
Start: 2020-08-08 | End: 2020-08-13 | Stop reason: HOSPADM

## 2020-08-08 RX ORDER — SODIUM CHLORIDE, SODIUM LACTATE, POTASSIUM CHLORIDE, CALCIUM CHLORIDE 600; 310; 30; 20 MG/100ML; MG/100ML; MG/100ML; MG/100ML
INJECTION, SOLUTION INTRAVENOUS CONTINUOUS
Status: DISCONTINUED | OUTPATIENT
Start: 2020-08-08 | End: 2020-08-08

## 2020-08-08 RX ORDER — METRONIDAZOLE 500 MG/100ML
500 INJECTION, SOLUTION INTRAVENOUS
Status: COMPLETED | OUTPATIENT
Start: 2020-08-08 | End: 2020-08-09

## 2020-08-08 RX ORDER — HYDROMORPHONE HYDROCHLORIDE 1 MG/ML
1 INJECTION, SOLUTION INTRAMUSCULAR; INTRAVENOUS; SUBCUTANEOUS
Status: DISCONTINUED | OUTPATIENT
Start: 2020-08-08 | End: 2020-08-13 | Stop reason: HOSPADM

## 2020-08-08 RX ORDER — ONDANSETRON 8 MG/1
8 TABLET, ORALLY DISINTEGRATING ORAL EVERY 8 HOURS PRN
Status: DISCONTINUED | OUTPATIENT
Start: 2020-08-08 | End: 2020-08-13 | Stop reason: HOSPADM

## 2020-08-08 RX ORDER — HYDROCODONE BITARTRATE AND ACETAMINOPHEN 500; 5 MG/1; MG/1
TABLET ORAL
Status: DISCONTINUED | OUTPATIENT
Start: 2020-08-09 | End: 2020-08-13 | Stop reason: HOSPADM

## 2020-08-08 RX ORDER — CHLORHEXIDINE GLUCONATE ORAL RINSE 1.2 MG/ML
10 SOLUTION DENTAL 2 TIMES DAILY
Status: DISPENSED | OUTPATIENT
Start: 2020-08-08 | End: 2020-08-12

## 2020-08-08 RX ORDER — GABAPENTIN 300 MG/1
300 CAPSULE ORAL 2 TIMES DAILY
Status: DISCONTINUED | OUTPATIENT
Start: 2020-08-08 | End: 2020-08-13 | Stop reason: HOSPADM

## 2020-08-08 RX ORDER — CIPROFLOXACIN 2 MG/ML
400 INJECTION, SOLUTION INTRAVENOUS
Status: COMPLETED | OUTPATIENT
Start: 2020-08-08 | End: 2020-08-08

## 2020-08-08 RX ORDER — DIPHENHYDRAMINE HYDROCHLORIDE 50 MG/ML
25 INJECTION INTRAMUSCULAR; INTRAVENOUS EVERY 4 HOURS PRN
Status: DISCONTINUED | OUTPATIENT
Start: 2020-08-08 | End: 2020-08-13 | Stop reason: HOSPADM

## 2020-08-08 RX ORDER — PAROXETINE HYDROCHLORIDE 20 MG/1
40 TABLET, FILM COATED ORAL EVERY MORNING
Status: DISCONTINUED | OUTPATIENT
Start: 2020-08-08 | End: 2020-08-13 | Stop reason: HOSPADM

## 2020-08-08 RX ORDER — METOCLOPRAMIDE HYDROCHLORIDE 5 MG/ML
5 INJECTION INTRAMUSCULAR; INTRAVENOUS EVERY 6 HOURS PRN
Status: DISCONTINUED | OUTPATIENT
Start: 2020-08-08 | End: 2020-08-13 | Stop reason: HOSPADM

## 2020-08-08 RX ORDER — ACETAMINOPHEN 500 MG
1000 TABLET ORAL EVERY 8 HOURS
Status: DISPENSED | OUTPATIENT
Start: 2020-08-08 | End: 2020-08-11

## 2020-08-08 RX ORDER — SODIUM CHLORIDE 0.9 % (FLUSH) 0.9 %
10 SYRINGE (ML) INJECTION
Status: DISCONTINUED | OUTPATIENT
Start: 2020-08-09 | End: 2020-08-13 | Stop reason: HOSPADM

## 2020-08-08 RX ORDER — DEXTROSE MONOHYDRATE, SODIUM CHLORIDE, AND POTASSIUM CHLORIDE 50; 1.49; 4.5 G/1000ML; G/1000ML; G/1000ML
INJECTION, SOLUTION INTRAVENOUS CONTINUOUS
Status: DISCONTINUED | OUTPATIENT
Start: 2020-08-08 | End: 2020-08-13 | Stop reason: HOSPADM

## 2020-08-08 RX ORDER — ENOXAPARIN SODIUM 100 MG/ML
40 INJECTION SUBCUTANEOUS EVERY 24 HOURS
Status: DISCONTINUED | OUTPATIENT
Start: 2020-08-08 | End: 2020-08-13 | Stop reason: HOSPADM

## 2020-08-08 RX ORDER — PANTOPRAZOLE SODIUM 40 MG/1
40 TABLET, DELAYED RELEASE ORAL DAILY
Status: DISCONTINUED | OUTPATIENT
Start: 2020-08-08 | End: 2020-08-13 | Stop reason: HOSPADM

## 2020-08-08 RX ADMIN — SODIUM CHLORIDE, SODIUM LACTATE, POTASSIUM CHLORIDE, AND CALCIUM CHLORIDE: .6; .31; .03; .02 INJECTION, SOLUTION INTRAVENOUS at 12:08

## 2020-08-08 RX ADMIN — PAROXETINE HYDROCHLORIDE HEMIHYDRATE 40 MG: 20 TABLET, FILM COATED ORAL at 06:08

## 2020-08-08 RX ADMIN — SODIUM CHLORIDE, SODIUM LACTATE, POTASSIUM CHLORIDE, AND CALCIUM CHLORIDE 1000 ML: .6; .31; .03; .02 INJECTION, SOLUTION INTRAVENOUS at 10:08

## 2020-08-08 RX ADMIN — PANTOPRAZOLE SODIUM 40 MG: 40 TABLET, DELAYED RELEASE ORAL at 09:08

## 2020-08-08 RX ADMIN — CHLORHEXIDINE GLUCONATE 0.12% ORAL RINSE 10 ML: 1.2 LIQUID ORAL at 09:08

## 2020-08-08 RX ADMIN — HYDROMORPHONE HYDROCHLORIDE 1 MG: 1 INJECTION, SOLUTION INTRAMUSCULAR; INTRAVENOUS; SUBCUTANEOUS at 04:08

## 2020-08-08 RX ADMIN — ACETAMINOPHEN 1000 MG: 500 TABLET ORAL at 09:08

## 2020-08-08 RX ADMIN — ACETAMINOPHEN 1000 MG: 500 TABLET ORAL at 06:08

## 2020-08-08 RX ADMIN — METRONIDAZOLE 500 MG: 500 INJECTION, SOLUTION INTRAVENOUS at 02:08

## 2020-08-08 RX ADMIN — TIZANIDINE 4 MG: 4 TABLET ORAL at 06:08

## 2020-08-08 RX ADMIN — SODIUM CHLORIDE, SODIUM LACTATE, POTASSIUM CHLORIDE, AND CALCIUM CHLORIDE 1000 ML: .6; .31; .03; .02 INJECTION, SOLUTION INTRAVENOUS at 08:08

## 2020-08-08 RX ADMIN — TIZANIDINE 4 MG: 4 TABLET ORAL at 09:08

## 2020-08-08 RX ADMIN — GABAPENTIN 300 MG: 300 CAPSULE ORAL at 09:08

## 2020-08-08 RX ADMIN — ACETAMINOPHEN 1000 MG: 500 TABLET ORAL at 01:08

## 2020-08-08 RX ADMIN — CIPROFLOXACIN 400 MG: 2 INJECTION, SOLUTION INTRAVENOUS at 09:08

## 2020-08-08 RX ADMIN — TIZANIDINE 4 MG: 4 TABLET ORAL at 01:08

## 2020-08-08 RX ADMIN — ENOXAPARIN SODIUM 40 MG: 30 INJECTION SUBCUTANEOUS at 04:08

## 2020-08-08 RX ADMIN — METRONIDAZOLE 500 MG: 500 INJECTION, SOLUTION INTRAVENOUS at 06:08

## 2020-08-08 RX ADMIN — METRONIDAZOLE 500 MG: 500 INJECTION, SOLUTION INTRAVENOUS at 11:08

## 2020-08-08 RX ADMIN — POTASSIUM CHLORIDE, DEXTROSE MONOHYDRATE AND SODIUM CHLORIDE: 150; 5; 450 INJECTION, SOLUTION INTRAVENOUS at 01:08

## 2020-08-08 RX ADMIN — HYDROMORPHONE HYDROCHLORIDE 1 MG: 1 INJECTION, SOLUTION INTRAMUSCULAR; INTRAVENOUS; SUBCUTANEOUS at 01:08

## 2020-08-08 RX ADMIN — CIPROFLOXACIN 400 MG: 2 INJECTION, SOLUTION INTRAVENOUS at 04:08

## 2020-08-08 NOTE — ASSESSMENT & PLAN NOTE
CT scan done after contrast enhanced enema today shows a sigmoid volvulus likely associated with a partial stricture of the distal sigmoid colon.  Due to the diverticulitis GI did not feel endoscopy was warranted.    I explained to the patient that we are worried about his sigmoid colon completely twisting and cutting off its blood supply or perforating from the distension.  He will need a laparotomy, sigmoid resection and end colostomy.  The colostomy can be reversed in the future.    Need for surgery was discussed with the patient.  The risks include infection, bleeding, strictureof the colostomy, retracting of the colostomy, and injury to the ureter.  Wound infection hernias an abscesses were also discussed

## 2020-08-08 NOTE — ANESTHESIA POSTPROCEDURE EVALUATION
Anesthesia Post Evaluation    Patient: Henrique Vargas JrYuan    Procedure(s) Performed: Procedure(s) (LRB):  EARLINE PROCEDURE (N/A)  COLECTOMY, SIGMOID (N/A)  CREATION, COLOSTOMY (N/A)    Final Anesthesia Type: general    Patient location during evaluation: PACU  Patient participation: Yes- Able to Participate  Level of consciousness: awake and alert  Post-procedure vital signs: reviewed and stable  Pain management: adequate  Airway patency: patent  TAE mitigation strategies: Multimodal analgesia, Extubation while patient is awake and Verification of full reversal of neuromuscular block  PONV status at discharge: No PONV  Anesthetic complications: no      Cardiovascular status: hemodynamically stable  Respiratory status: unassisted  Follow-up not needed.          Vitals Value Taken Time   /83 08/07/20 2309   Temp 97.5 08/07/20 2312   Pulse 55 08/07/20 2311   Resp 16 08/07/20 2311   SpO2 100 % 08/07/20 2311   Vitals shown include unvalidated device data.      No case tracking events are documented in the log.      Pain/John Score: Pain Rating Prior to Med Admin: 9 (8/7/2020  5:43 PM)

## 2020-08-08 NOTE — OP NOTE
[facility]  Surgery Department  Operative Note    SUMMARY     Date of Procedure: 8/7/2020     Procedure: Procedure(s) (LRB):  EARLINE PROCEDURE (N/A)  COLECTOMY, SIGMOID (N/A)  CREATION, COLOSTOMY (N/A)     Surgeon(s) and Role:     * Sincere Will MD - Primary    Assisting Surgeon: None    Pre-Operative Diagnosis: Volvulus of sigmoid colon [K56.2]    Post-Operative Diagnosis: Post-Op Diagnosis Codes:     * Volvulus of sigmoid colon [K56.2]    Anesthesia: General    Technical Procedures Used:     Open sigmoid resection and end colostomy    Findings sigmoid diverticulitis causing the colon to kink/twist on itself causing partial obstruction and dilation/partial volvulus    Description of the Findings of the Procedure:     The patient was brought into the operative room placed on the operative table supine position.  General endotracheal anesthesia was induced.  Antibiotics were administered.  A Mckeon catheter was inserted.  Pneumatic compression devices placed on lower extremities.  The abdomen was clipped, prepped and draped in the standard fashion.    A time-out was performed.    A midline incision was made.  Subcutaneous tissues were dissected using electrocautery.  Fascia was identified and opened in the midline.  A wound protector was inserted.  A self-retaining retractor was inserted.    The proximal colon was found to be dilated secondary to diverticular disease causing a kinking and twisting of the colon at the pelvic brim.    The left lateral line of Toldt was incised and the colon was mobilized medially.  We mobilized the splenic flexure by taking down the splenic colonic ligament.  During this process a small opening was made in the colon and the colon was decompressed.  The opening was closed with a 2 0 silk pop-off suture.    The sigmoid colon attached to the lateral abdominal wall was then dissected off in the line of Toldt was incised to the pelvic brim.  The ureter was identified.   Where the colon was adherent to itself it was divided to separate the limbs, small opening was made and after the limbs were divided the opening was closed with 2 0 silk in a figure-of-eight fashion.    The proximal colon was divided with the contour stapler using a blue load.  The mesentery was taken down with the large bipolar device/LigaSure.  The superior rectal vessels were clamped and ligated and then suture ligated.  The dissection was carried out till we reached the proximal rectum.  The rectum was the fat id.    The proximal rectum was divided with the contour stapler using a green her longer staple load.    Hemostasis along the suture line was achieved with electrocautery.  Hemostasis along the mesentery was ensured.  Seprafilm was placed.    We then mobilized the cyst lateral line of Toldt in the area of the splenic flexure to bring the colon slightly more medial.    An appropriate place was identified on the left upper quadrant and a 5 cm oval of skin was removed.  The fascia was divided in a cruciate manner anteriorly the muscle was split the fascia was divided longitudinally posteriorly and the colon was brought out through the colostomy site.  The distal colon was noted to be somewhat ischemic but this was expected as part of the distal mesentery was taken down to allow for straightening of the colon.    The colon was secured to the abdominal wall around the colostomy site internally with 3 0 silk sutures.    Marcaine and Exparel were infiltrated into the layers of the abdominal wall creating a dominant wall block    .  The abdominal cavity was irrigated.  Additional Seprafilm was placed.  The midline fascia was closed with looped PDS in a running fashion.  The skin was closed with staples    The distal colon that was brought out through the colostomy site was divided along the line of demarcation.  The colostomy was matured after the fashion of Gayla.  This was done with 3 0 interrupted Vicryl  sutures.    Dry sterile dressings and a colostomy bag were placed.    Patient tolerated procedure well.  Sponge and instrument counts were correct.  He was transferred to the recovery room in hemodynamically stable condition    Significant Surgical Tasks Conducted by the Assistant(s), if Applicable:  None    Complications: No    Estimated Blood Loss (EBL): 200 mL  IV fluids 1500 mL  Marcaine 0.25% 30 mL  Exparel 266 mg           Implants:   Implant Name Type Inv. Item Serial No.  Lot No. LRB No. Used Action   MEMBRANE SEPRAFILM 5 X 6 - SN/A  MEMBRANE SEPRAFILM 5 X 6 N/A [employer] [id number] N/A 1 Implanted   MEMBRANE SEPRAFILM 5 X 6 - SN/A  MEMBRANE SEPRAFILM 5 X 6 N/A [employer] [id number] N/A 1 Implanted       Specimens:   Specimen (12h ago, onward)    None                  Condition: Stable    Disposition: PACU - hemodynamically stable.    Attestation: I performed the procedure.

## 2020-08-08 NOTE — HPI
The patient is a 74-year-old male who has undergone colonoscopies were is found to have some diverticular changes.  Earlier today he underwent a Gastrografin or barium enema at the [facility] imaging center.  He had crampy abdominal pain and distension he presented to the emergency room.    The patient says that he is persistently bradycardic.  He denies any chest pain or shortness of breath.  He does have some left-sided abdominal pain    He was seen at a outpatient clinic abdominal film done there showed non specific bowel gas past

## 2020-08-08 NOTE — CONSULTS
[facility]  Cardiology  Consult Note    Patient Name: Henrique Vargas Jr.  MRN: [id number]  Admission Date: 8/7/2020  Hospital Length of Stay: 1 days  Code Status: No Order   Attending Provider: Sincere Will MD   Consulting Provider: Earl Encinas Md, MD  Primary Care Physician: Bjorn Warner MD  Principal Problem:Volvulus of sigmoid colon    Patient information was obtained from patient, past medical records and ER records.     Inpatient consult to Cardiology  Consult performed by: Earl Encinas MD  Consult ordered by: Sincere Will MD  Reason for consult: Bradycardia, HTN        Subjective:     Chief Complaint:  abd pain     HPI:   Henrique Vargas Jr. is a 74 y.o. male pt with HTN, HLD, GERD, depression, back pain presented for abd pain he is s/p sigmoid resection and colostomy. Pt seen by cardiologist Dr. Wong last year, had SANTOS had stress ECHO with low risk, has been on atenolol presumably for HTN, pt had episodes of bradycardia overnight, stopped BB now. He feels well cardiac wise. Discussed will stop BB and monitor BP. No CP/SOB.     Past Medical History:   Diagnosis Date    Diverticulitis     Prostate cancer        Past Surgical History:   Procedure Laterality Date    APPENDECTOMY      BACK SURGERY      PROSTATECTOMY         Review of patient's allergies indicates:  No Known Allergies    No current facility-administered medications on file prior to encounter.      Current Outpatient Medications on File Prior to Encounter   Medication Sig    acetaminophen (TYLENOL) 650 MG TbSR Take 650 mg by mouth every 8 (eight) hours.    alendronate (FOSAMAX) 70 MG tablet     atenolol (TENORMIN) 50 MG tablet Take 50 mg by mouth once daily.    gabapentin (NEURONTIN) 300 MG capsule     omeprazole (PRILOSEC) 40 MG capsule Take 40 mg by mouth.    oxyCODONE-acetaminophen (PERCOCET)  mg per tablet     paroxetine (PAXIL) 40 MG tablet Take 40 mg by mouth every morning.    tiZANidine (ZANAFLEX)  4 MG tablet     oxycodone (OXY-IR) 5 mg Cap Take 5 mg by mouth every 4 (four) hours as needed.     Family History     None        Tobacco Use    Smoking status: Never Smoker   Substance and Sexual Activity    Alcohol use: No    Drug use: No    Sexual activity: Not on file     Review of Systems   Constitution: Positive for decreased appetite and malaise/fatigue.   HENT: Negative.    Eyes: Negative.    Cardiovascular: Negative.    Respiratory: Negative.    Endocrine: Negative.    Hematologic/Lymphatic: Negative.    Skin: Negative.    Musculoskeletal: Negative.    Gastrointestinal: Positive for abdominal pain and change in bowel habit.   Genitourinary: Negative.    Neurological: Negative.    Psychiatric/Behavioral: Negative.    Allergic/Immunologic: Negative.      Objective:     Vital Signs (Most Recent):  Temp: 97.4 °F (36.3 °C) (08/08/20 0810)  Pulse: 60 (08/08/20 0846)  Resp: 18 (08/08/20 0846)  BP: 114/62 (08/08/20 0810)  SpO2: 95 % (08/08/20 0846) Vital Signs (24h Range):  Temp:  [97 °F (36.1 °C)-98.7 °F (37.1 °C)] 97.4 °F (36.3 °C)  Pulse:  [36-65] 60  Resp:  [13-24] 18  SpO2:  [90 %-100 %] 95 %  BP: (114-175)/(62-96) 114/62     Weight: 56.4 kg (124 lb 5.4 oz)  Body mass index is 18.91 kg/m².    SpO2: 95 %  O2 Device (Oxygen Therapy): nasal cannula      Intake/Output Summary (Last 24 hours) at 8/8/2020 1138  Last data filed at 8/8/2020 0602  Gross per 24 hour   Intake 2310 ml   Output 525 ml   Net 1785 ml       Lines/Drains/Airways     Drain                 Colostomy 08/07/20 2252 LUQ less than 1 day         Urethral Catheter 08/07/20 2115 Straight-tip;Latex 16 Fr. less than 1 day          Peripheral Intravenous Line                 Peripheral IV - Single Lumen 08/07/20 2308 18 G Left Forearm less than 1 day         Peripheral IV - Single Lumen 08/07/20 2308 18 G Right Forearm less than 1 day                Physical Exam   Constitutional: He is oriented to person, place, and time. He appears well-developed  and well-nourished. No distress.   HENT:   Head: Normocephalic and atraumatic.   Nose: Nose normal.   Mouth/Throat: Oropharynx is clear and moist.   Eyes: Conjunctivae and EOM are normal. No scleral icterus.   Neck: Normal range of motion. Neck supple. No JVD present. No thyromegaly present.   Cardiovascular: Normal rate, regular rhythm, S1 normal and S2 normal. Exam reveals no gallop, no S3, no S4 and no friction rub.   No murmur heard.  Pulmonary/Chest: Effort normal and breath sounds normal. No stridor. No respiratory distress. He has no wheezes. He has no rales. He exhibits no tenderness.   Abdominal: He exhibits no distension and no mass. There is abdominal tenderness. There is no rebound.   Genitourinary:    Genitourinary Comments: Deferred     Musculoskeletal: Normal range of motion.         General: No tenderness, deformity or edema.   Lymphadenopathy:     He has no cervical adenopathy.   Neurological: He is alert and oriented to person, place, and time. He exhibits normal muscle tone. Coordination normal.   Skin: Skin is warm and dry. No rash noted. He is not diaphoretic. No erythema. No pallor.   Psychiatric: He has a normal mood and affect. His behavior is normal. Judgment and thought content normal.   Nursing note and vitals reviewed.      Significant Labs:   All pertinent lab results from the last 24 hours have been reviewed. and   Recent Lab Results       08/08/20  0718   08/07/20  2311   08/07/20  1926   08/07/20  1851   08/07/20  1443        Albumin         4.0     Alkaline Phosphatase         44     ALT         13     Anion Gap 8       11     Aniso Slight             Appearance, UA       Clear       AST         27     Baso # 0.00 0.03     0.04     Basophil% 0.0 0.7     0.7     Bilirubin (UA)       Negative       BILIRUBIN TOTAL         0.4  Comment:  For infants and newborns, interpretation of results should be based  on gestational age, weight and in agreement with  clinical  observations.  Premature Infant recommended reference ranges:  Up to 24 hours.............<8.0 mg/dL  Up to 48 hours............<12.0 mg/dL  3-5 days..................<15.0 mg/dL  6-29 days.................<15.0 mg/dL       BUN, Bld 11       9     Calcium 8.0       9.3     Chloride 105       102     CO2 27       29     Color, UA       Yellow       Creatinine 0.9       0.9     Differential Method Automated Automated     Automated     eGFR if  >60       >60     eGFR if non  >60  Comment:  Calculation used to obtain the estimated glomerular filtration  rate (eGFR) is the CKD-EPI equation.          >60  Comment:  Calculation used to obtain the estimated glomerular filtration  rate (eGFR) is the CKD-EPI equation.        Eos # 0.0 0.1     0.2     Eosinophil% 0.0 2.4     2.6     Glucose 88       99     Glucose, UA       Negative       Gran # (ANC) 2.4 3.1     4.6     Gran% 83.0 74.7     77.8     Group & Rh     O NEG         Hematocrit 30.8 35.7     33.1     Hemoglobin 9.4 10.8     10.2     Hypo Occasional             Immature Grans (Abs) 0.01  Comment:  Mild elevation in immature granulocytes is non specific and   can be seen in a variety of conditions including stress response,   acute inflammation, trauma and pregnancy. Correlation with other   laboratory and clinical findings is essential.   0.00  Comment:  Mild elevation in immature granulocytes is non specific and   can be seen in a variety of conditions including stress response,   acute inflammation, trauma and pregnancy. Correlation with other   laboratory and clinical findings is essential.       0.02  Comment:  Mild elevation in immature granulocytes is non specific and   can be seen in a variety of conditions including stress response,   acute inflammation, trauma and pregnancy. Correlation with other   laboratory and clinical findings is essential.       Immature Granulocytes 0.3 0.0     0.3     INDIRECT YOSSI     NEG          Ketones, UA       1+       Leukocytes, UA       Negative       Lipase         25     Lymph # 0.2 0.8     0.8     Lymph% 8.2 19.5     13.1     MCH 29.1 29.4     29.2     MCHC 30.5 30.3     30.8     MCV 95 97     95     Mono # 0.3 0.1     0.3     Mono% 8.5 2.7     5.5     MPV 9.9 10.0     9.9     NITRITE UA       Negative       nRBC 0 0     0     Occult Blood UA       Trace       Ovalocytes Occasional             pH, UA       >8.0       Platelet Estimate Appears normal             Platelets 158 222     190     Poik Slight             Potassium 3.5       3.5     PROTEIN TOTAL         7.3     Protein, UA       Trace  Comment:  Recommend a 24 hour urine protein or a urine   protein/creatinine ratio if globulin induced proteinuria is  clinically suspected.         RBC 3.23 3.67     3.49     RDW 14.3 14.3     14.1     SARS-CoV-2 RNA, Amplification, Qual     Negative  Comment:  This test utilizes isothermal nucleic acid amplification   technology to detect the SARS-CoV-2 RdRp nucleic acid segment.   The analytical sensitivity (limit of detection) is 125 genome   equivalents/mL.   A POSITIVE result implies infection with the SARS-CoV-2 virus;  the patient is presumed to be contagious.    A NEGATIVE result means that SARS-CoV-2 nucleic acids are not  present above the limit of detection. A NEGATIVE result should be   treated as presumptive. It does not rule out the possibility of   COVID-19 and should not be the sole basis for treatment decisions.   If COVID-19 is strongly suspected based on clinical and exposure   history, re-testing using an alternate molecular assay should be   considered.   This test is only for use under the Food and Drug   Administration s Emergency Use Authorization (EUA).   Commercial kits are provided by [employer].   Performance characteristics of the EUA have been independently  verified by [facility] Department of  Pathology and Laboratory Medicine.    _________________________________________________________________  The ID NOW COVID-19 Letter of Authorization, along with the   authorized Fact Sheet for Healthcare Providers, the authorized Fact  Sheet for Patients, and authorized labeling are available on the FDA   website:  www.fda.gov/MedicalDevices/Safety/EmergencySituations/tao035229.htm           Sodium 140       142     Specific Williamson, UA       1.010       Specimen UA       Urine, Clean Catch       UROBILINOGEN UA       Negative       WBC 2.93 4.10     5.86                            Assessment and Plan:     * Volvulus of sigmoid colon  Cont tx per gen surg    Essential hypertension  BP and HR well controlled now  DC atenolol  If BP is elevated rec Norvasc 5 mg and can titrate    Diverticular stricture  S/p sigmoid resection and colostomy  Cont tx per gen surg        VTE Risk Mitigation (From admission, onward)         Ordered     enoxaparin injection 40 mg  Every 24 hours      08/08/20 0012     IP VTE HIGH RISK PATIENT  Once      08/08/20 0012     Place sequential compression device  Until discontinued      08/08/20 0012                Thank you for your consult. I will follow-up with patient. Please contact us if you have any additional questions.    Earl Encinas Md, MD  Cardiology   [facility]

## 2020-08-08 NOTE — PROGRESS NOTES
[facility] -   General Surgery  Progress Note    Subjective:     History of Present Illness:  The patient is a 74-year-old male who has undergone colonoscopies were is found to have some diverticular changes.  Earlier today he underwent a Gastrografin or barium enema at the [facility] imaging center.  He had crampy abdominal pain and distension he presented to the emergency room.    The patient says that he is persistently bradycardic.  He denies any chest pain or shortness of breath.  He does have some left-sided abdominal pain    Post-Op Info:  Procedure(s) (LRB):  EARLINE PROCEDURE (N/A)  COLECTOMY, SIGMOID (N/A)  CREATION, COLOSTOMY (N/A)   1 Day Post-Op     Interval History:  Incisional pain.  No significant colostomy output.    Medications:  Continuous Infusions:   lactated ringers 100 mL/hr at 08/08/20 0026     Scheduled Meds:   acetaminophen  1,000 mg Oral Q8H    chlorhexidine  10 mL Mouth/Throat BID    ciprofloxacin (CIPRO)400mg/200ml D5W IVPB  400 mg Intravenous Q8H    enoxaparin  40 mg Subcutaneous Q24H    gabapentin  300 mg Oral BID    metronidazole  500 mg Intravenous Q8H    nozaseptin   Each Nostril BID    pantoprazole  40 mg Oral Daily    paroxetine  40 mg Oral QAM    tiZANidine  4 mg Oral Q8H     PRN Meds:diphenhydrAMINE, HYDROmorphone, HYDROmorphone, metoclopramide HCl, ondansetron     Review of patient's allergies indicates:  No Known Allergies  Objective:     Vital Signs (Most Recent):  Temp: 97.4 °F (36.3 °C) (08/08/20 0810)  Pulse: 60 (08/08/20 0846)  Resp: 18 (08/08/20 0846)  BP: 114/62 (08/08/20 0810)  SpO2: 95 % (08/08/20 0846) Vital Signs (24h Range):  Temp:  [97 °F (36.1 °C)-98.7 °F (37.1 °C)] 97.4 °F (36.3 °C)  Pulse:  [36-65] 60  Resp:  [13-24] 18  SpO2:  [90 %-100 %] 95 %  BP: (114-175)/(62-96) 114/62     Weight: 56.4 kg (124 lb 5.4 oz)  Body mass index is 18.91 kg/m².    Intake/Output - Last 3 Shifts       08/06 0700 - 08/07 0659 08/07 0700 -  08/08 0659 08/08 0700 - 08/09 0659    I.V. (mL/kg)  2210 (39.2)     IV Piggyback  100     Total Intake(mL/kg)  2310 (41)     Urine (mL/kg/hr)  325     Stool  0     Blood  200     Total Output  525     Net  +1785            Stool Occurrence  0 x           Physical Exam  Vitals signs and nursing note reviewed.   Constitutional:       Appearance: Normal appearance.   Eyes:      General: No scleral icterus.  Cardiovascular:      Rate and Rhythm: Normal rate and regular rhythm.   Pulmonary:      Effort: Pulmonary effort is normal.      Breath sounds: Normal breath sounds.   Abdominal:      General: Abdomen is flat. Bowel sounds are normal. There is no distension.      Palpations: Abdomen is soft.      Tenderness: There is abdominal tenderness (Incisional).      Comments: Incision is dressed.  Colostomy is pink and mildly edematous   Musculoskeletal:      Right lower leg: No edema.      Left lower leg: No edema.   Skin:     General: Skin is warm and dry.      Capillary Refill: Capillary refill takes less than 2 seconds.   Neurological:      General: No focal deficit present.      Mental Status: He is alert.   Psychiatric:         Mood and Affect: Mood normal.         Behavior: Behavior normal.         Thought Content: Thought content normal.         Judgment: Judgment normal.         Significant Labs:  CBC:   Recent Labs   Lab 08/08/20  0718   WBC 2.93*   RBC 3.23*   HGB 9.4*   HCT 30.8*      MCV 95   MCH 29.1   MCHC 30.5*     BMP:   Recent Labs   Lab 08/08/20  0718   GLU 88      K 3.5      CO2 27   BUN 11   CREATININE 0.9   CALCIUM 8.0*     CMP:   Recent Labs   Lab 08/07/20  1443 08/08/20  0718   GLU 99 88   CALCIUM 9.3 8.0*   ALBUMIN 4.0  --    PROT 7.3  --     140   K 3.5 3.5   CO2 29 27    105   BUN 9 11   CREATININE 0.9 0.9   ALKPHOS 44*  --    ALT 13  --    AST 27  --    BILITOT 0.4  --        Significant Diagnostics:  No new    Assessment/Plan:     * Volvulus of sigmoid colon  Colonic  distension and the peritoneal volvulus is likely related to the area apply dry to doing a Gastrografin enema.      Diverticular stricture  Postop day 1 from a sigmoid resection and colostomy.    Mobilized from adhesions IV fluids, repeat labs in the morning.  Monitor for colostomy output        Sincere Will MD  General Surgery  [facility]

## 2020-08-08 NOTE — ASSESSMENT & PLAN NOTE
Colonic distension and the peritoneal volvulus is likely related to the area apply dry to doing a Gastrografin enema.

## 2020-08-08 NOTE — HPI
Henrique BUFFY Vargas Jr. is a 74 y.o. male pt with HTN, HLD, GERD, depression, back pain presented for abd pain he is s/p sigmoid resection and colostomy. Pt seen by cardiologist Dr. Wong last year, had SANTOS had stress ECHO with low risk, has been on atenolol presumably for HTN, pt had episodes of bradycardia overnight, stopped BB now. He feels well cardiac wise. Discussed will stop BB and monitor BP. No CP/SOB.

## 2020-08-08 NOTE — SUBJECTIVE & OBJECTIVE
Interval History:  Incisional pain.  No significant colostomy output.    Medications:  Continuous Infusions:   lactated ringers 100 mL/hr at 08/08/20 0026     Scheduled Meds:   acetaminophen  1,000 mg Oral Q8H    chlorhexidine  10 mL Mouth/Throat BID    ciprofloxacin (CIPRO)400mg/200ml D5W IVPB  400 mg Intravenous Q8H    enoxaparin  40 mg Subcutaneous Q24H    gabapentin  300 mg Oral BID    metronidazole  500 mg Intravenous Q8H    nozaseptin   Each Nostril BID    pantoprazole  40 mg Oral Daily    paroxetine  40 mg Oral QAM    tiZANidine  4 mg Oral Q8H     PRN Meds:diphenhydrAMINE, HYDROmorphone, HYDROmorphone, metoclopramide HCl, ondansetron     Review of patient's allergies indicates:  No Known Allergies  Objective:     Vital Signs (Most Recent):  Temp: 97.4 °F (36.3 °C) (08/08/20 0810)  Pulse: 60 (08/08/20 0846)  Resp: 18 (08/08/20 0846)  BP: 114/62 (08/08/20 0810)  SpO2: 95 % (08/08/20 0846) Vital Signs (24h Range):  Temp:  [97 °F (36.1 °C)-98.7 °F (37.1 °C)] 97.4 °F (36.3 °C)  Pulse:  [36-65] 60  Resp:  [13-24] 18  SpO2:  [90 %-100 %] 95 %  BP: (114-175)/(62-96) 114/62     Weight: 56.4 kg (124 lb 5.4 oz)  Body mass index is 18.91 kg/m².    Intake/Output - Last 3 Shifts       08/06 0700 - 08/07 0659 08/07 0700 - 08/08 0659 08/08 0700 - 08/09 0659    I.V. (mL/kg)  2210 (39.2)     IV Piggyback  100     Total Intake(mL/kg)  2310 (41)     Urine (mL/kg/hr)  325     Stool  0     Blood  200     Total Output  525     Net  +1785            Stool Occurrence  0 x           Physical Exam  Vitals signs and nursing note reviewed.   Constitutional:       Appearance: Normal appearance.   Eyes:      General: No scleral icterus.  Cardiovascular:      Rate and Rhythm: Normal rate and regular rhythm.   Pulmonary:      Effort: Pulmonary effort is normal.      Breath sounds: Normal breath sounds.   Abdominal:      General: Abdomen is flat. Bowel sounds are normal. There is no distension.      Palpations: Abdomen is soft.       Tenderness: There is abdominal tenderness (Incisional).      Comments: Incision is dressed.  Colostomy is pink and mildly edematous   Musculoskeletal:      Right lower leg: No edema.      Left lower leg: No edema.   Skin:     General: Skin is warm and dry.      Capillary Refill: Capillary refill takes less than 2 seconds.   Neurological:      General: No focal deficit present.      Mental Status: He is alert.   Psychiatric:         Mood and Affect: Mood normal.         Behavior: Behavior normal.         Thought Content: Thought content normal.         Judgment: Judgment normal.         Significant Labs:  CBC:   Recent Labs   Lab 08/08/20 0718   WBC 2.93*   RBC 3.23*   HGB 9.4*   HCT 30.8*      MCV 95   MCH 29.1   MCHC 30.5*     BMP:   Recent Labs   Lab 08/08/20  0718   GLU 88      K 3.5      CO2 27   BUN 11   CREATININE 0.9   CALCIUM 8.0*     CMP:   Recent Labs   Lab 08/07/20  1443 08/08/20 0718   GLU 99 88   CALCIUM 9.3 8.0*   ALBUMIN 4.0  --    PROT 7.3  --     140   K 3.5 3.5   CO2 29 27    105   BUN 9 11   CREATININE 0.9 0.9   ALKPHOS 44*  --    ALT 13  --    AST 27  --    BILITOT 0.4  --        Significant Diagnostics:  No new

## 2020-08-08 NOTE — PLAN OF CARE
Problem: Adult Inpatient Plan of Care  Goal: Plan of Care Review  Outcome: Ongoing, Progressing     POC reviewed, including indications and possible side effects of administered medications. Patient verbalized understanding and teach back, but needs reinforcement. No adverse reactions noted. Patient c/o abdominal pain. Administered medications per order. Dressing remains CDI. Colostomy and lora care performed. Tolerating diet well. Denies n/v. SB/NSR on heart monitor. VSS. NADN. Patient remains free of falls and injuries during shift. Will continue to monitor.    Chart check complete.

## 2020-08-08 NOTE — SUBJECTIVE & OBJECTIVE
IGG infusing as ordered without adverse reactions. No change in bruising or petechiae since admit. Family members at bedside. Pt tolerating diet w/o nausea vomiting. VSS during infusion.   Past Medical History:   Diagnosis Date    Diverticulitis     Prostate cancer        Past Surgical History:   Procedure Laterality Date    APPENDECTOMY      BACK SURGERY      PROSTATECTOMY         Review of patient's allergies indicates:  No Known Allergies    No current facility-administered medications on file prior to encounter.      Current Outpatient Medications on File Prior to Encounter   Medication Sig    acetaminophen (TYLENOL) 650 MG TbSR Take 650 mg by mouth every 8 (eight) hours.    alendronate (FOSAMAX) 70 MG tablet     atenolol (TENORMIN) 50 MG tablet Take 50 mg by mouth once daily.    gabapentin (NEURONTIN) 300 MG capsule     omeprazole (PRILOSEC) 40 MG capsule Take 40 mg by mouth.    oxyCODONE-acetaminophen (PERCOCET)  mg per tablet     paroxetine (PAXIL) 40 MG tablet Take 40 mg by mouth every morning.    tiZANidine (ZANAFLEX) 4 MG tablet     oxycodone (OXY-IR) 5 mg Cap Take 5 mg by mouth every 4 (four) hours as needed.     Family History     None        Tobacco Use    Smoking status: Never Smoker   Substance and Sexual Activity    Alcohol use: No    Drug use: No    Sexual activity: Not on file     Review of Systems   Constitution: Positive for decreased appetite and malaise/fatigue.   HENT: Negative.    Eyes: Negative.    Cardiovascular: Negative.    Respiratory: Negative.    Endocrine: Negative.    Hematologic/Lymphatic: Negative.    Skin: Negative.    Musculoskeletal: Negative.    Gastrointestinal: Positive for abdominal pain and change in bowel habit.   Genitourinary: Negative.    Neurological: Negative.    Psychiatric/Behavioral: Negative.    Allergic/Immunologic: Negative.      Objective:     Vital Signs (Most Recent):  Temp: 97.4 °F (36.3 °C) (08/08/20 0810)  Pulse: 60 (08/08/20 0846)  Resp: 18 (08/08/20 0846)  BP: 114/62 (08/08/20 0810)  SpO2: 95 % (08/08/20 0846) Vital Signs (24h Range):  Temp:  [97 °F (36.1 °C)-98.7 °F (37.1 °C)] 97.4 °F (36.3 °C)  Pulse:  [36-65]  60  Resp:  [13-24] 18  SpO2:  [90 %-100 %] 95 %  BP: (114-175)/(62-96) 114/62     Weight: 56.4 kg (124 lb 5.4 oz)  Body mass index is 18.91 kg/m².    SpO2: 95 %  O2 Device (Oxygen Therapy): nasal cannula      Intake/Output Summary (Last 24 hours) at 8/8/2020 1138  Last data filed at 8/8/2020 0602  Gross per 24 hour   Intake 2310 ml   Output 525 ml   Net 1785 ml       Lines/Drains/Airways     Drain                 Colostomy 08/07/20 2252 LUQ less than 1 day         Urethral Catheter 08/07/20 2115 Straight-tip;Latex 16 Fr. less than 1 day          Peripheral Intravenous Line                 Peripheral IV - Single Lumen 08/07/20 2308 18 G Left Forearm less than 1 day         Peripheral IV - Single Lumen 08/07/20 2308 18 G Right Forearm less than 1 day                Physical Exam   Constitutional: He is oriented to person, place, and time. He appears well-developed and well-nourished. No distress.   HENT:   Head: Normocephalic and atraumatic.   Nose: Nose normal.   Mouth/Throat: Oropharynx is clear and moist.   Eyes: Conjunctivae and EOM are normal. No scleral icterus.   Neck: Normal range of motion. Neck supple. No JVD present. No thyromegaly present.   Cardiovascular: Normal rate, regular rhythm, S1 normal and S2 normal. Exam reveals no gallop, no S3, no S4 and no friction rub.   No murmur heard.  Pulmonary/Chest: Effort normal and breath sounds normal. No stridor. No respiratory distress. He has no wheezes. He has no rales. He exhibits no tenderness.   Abdominal: He exhibits no distension and no mass. There is abdominal tenderness. There is no rebound.   Genitourinary:    Genitourinary Comments: Deferred     Musculoskeletal: Normal range of motion.         General: No tenderness, deformity or edema.   Lymphadenopathy:     He has no cervical adenopathy.   Neurological: He is alert and oriented to person, place, and time. He exhibits normal muscle tone. Coordination normal.   Skin: Skin is warm and dry. No rash noted.  He is not diaphoretic. No erythema. No pallor.   Psychiatric: He has a normal mood and affect. His behavior is normal. Judgment and thought content normal.   Nursing note and vitals reviewed.      Significant Labs:   All pertinent lab results from the last 24 hours have been reviewed. and   Recent Lab Results       08/08/20  0718   08/07/20  2311   08/07/20  1926   08/07/20  1851   08/07/20  1443        Albumin         4.0     Alkaline Phosphatase         44     ALT         13     Anion Gap 8       11     Aniso Slight             Appearance, UA       Clear       AST         27     Baso # 0.00 0.03     0.04     Basophil% 0.0 0.7     0.7     Bilirubin (UA)       Negative       BILIRUBIN TOTAL         0.4  Comment:  For infants and newborns, interpretation of results should be based  on gestational age, weight and in agreement with clinical  observations.  Premature Infant recommended reference ranges:  Up to 24 hours.............<8.0 mg/dL  Up to 48 hours............<12.0 mg/dL  3-5 days..................<15.0 mg/dL  6-29 days.................<15.0 mg/dL       BUN, Bld 11       9     Calcium 8.0       9.3     Chloride 105       102     CO2 27       29     Color, UA       Yellow       Creatinine 0.9       0.9     Differential Method Automated Automated     Automated     eGFR if  >60       >60     eGFR if non  >60  Comment:  Calculation used to obtain the estimated glomerular filtration  rate (eGFR) is the CKD-EPI equation.          >60  Comment:  Calculation used to obtain the estimated glomerular filtration  rate (eGFR) is the CKD-EPI equation.        Eos # 0.0 0.1     0.2     Eosinophil% 0.0 2.4     2.6     Glucose 88       99     Glucose, UA       Negative       Gran # (ANC) 2.4 3.1     4.6     Gran% 83.0 74.7     77.8     Group & Rh     O NEG         Hematocrit 30.8 35.7     33.1     Hemoglobin 9.4 10.8     10.2     Hypo Occasional             Immature Grans (Abs)  0.01  Comment:  Mild elevation in immature granulocytes is non specific and   can be seen in a variety of conditions including stress response,   acute inflammation, trauma and pregnancy. Correlation with other   laboratory and clinical findings is essential.   0.00  Comment:  Mild elevation in immature granulocytes is non specific and   can be seen in a variety of conditions including stress response,   acute inflammation, trauma and pregnancy. Correlation with other   laboratory and clinical findings is essential.       0.02  Comment:  Mild elevation in immature granulocytes is non specific and   can be seen in a variety of conditions including stress response,   acute inflammation, trauma and pregnancy. Correlation with other   laboratory and clinical findings is essential.       Immature Granulocytes 0.3 0.0     0.3     INDIRECT YOSSI     NEG         Ketones, UA       1+       Leukocytes, UA       Negative       Lipase         25     Lymph # 0.2 0.8     0.8     Lymph% 8.2 19.5     13.1     MCH 29.1 29.4     29.2     MCHC 30.5 30.3     30.8     MCV 95 97     95     Mono # 0.3 0.1     0.3     Mono% 8.5 2.7     5.5     MPV 9.9 10.0     9.9     NITRITE UA       Negative       nRBC 0 0     0     Occult Blood UA       Trace       Ovalocytes Occasional             pH, UA       >8.0       Platelet Estimate Appears normal             Platelets 158 222     190     Poik Slight             Potassium 3.5       3.5     PROTEIN TOTAL         7.3     Protein, UA       Trace  Comment:  Recommend a 24 hour urine protein or a urine   protein/creatinine ratio if globulin induced proteinuria is  clinically suspected.         RBC 3.23 3.67     3.49     RDW 14.3 14.3     14.1     SARS-CoV-2 RNA, Amplification, Qual     Negative  Comment:  This test utilizes isothermal nucleic acid amplification   technology to detect the SARS-CoV-2 RdRp nucleic acid segment.   The analytical sensitivity (limit of detection) is 125 genome    equivalents/mL.   A POSITIVE result implies infection with the SARS-CoV-2 virus;  the patient is presumed to be contagious.    A NEGATIVE result means that SARS-CoV-2 nucleic acids are not  present above the limit of detection. A NEGATIVE result should be   treated as presumptive. It does not rule out the possibility of   COVID-19 and should not be the sole basis for treatment decisions.   If COVID-19 is strongly suspected based on clinical and exposure   history, re-testing using an alternate molecular assay should be   considered.   This test is only for use under the Food and Drug   Administration s Emergency Use Authorization (EUA).   Commercial kits are provided by [employer].   Performance characteristics of the EUA have been independently  verified by [facility] Department of  Pathology and Laboratory Medicine.   _________________________________________________________________  The ID NOW COVID-19 Letter of Authorization, along with the   authorized Fact Sheet for Healthcare Providers, the authorized Fact  Sheet for Patients, and authorized labeling are available on the FDA   website:  www.fda.gov/MedicalDevices/Safety/EmergencySituations/bhp955119.htm           Sodium 140       142     Specific [place], UA       1.010       Specimen UA       Urine, Clean Catch       UROBILINOGEN UA       Negative       WBC 2.93 4.10     5.86

## 2020-08-08 NOTE — HOSPITAL COURSE
A CT scan done in the emergency room showed a dilated colon with concerns for a sigmoid volvulus as well as some diverticular changes.  According to the emergency room doctor the GI physician had concerns with colonoscopy due to the diverticulitis seen on the CT scan.  A surgical consultation was obtained.    08/08/2020.  Postop day 1 from a sigmoid resection and end colostomy for diverticulitis causing partial obstruction.  Patient has incisional pain.    08/09/2020.  Postoperative day 2 sigmoid resection and colostomy.  Blood pressure improved.  Transferred 1 unit.  Pain is better controlled.  Little ostomy output    08/10/2020 postop day 2 sigmoid resection and colostomy.  Patient's blood pressure has improved.  He complains of abdominal pain.  He does have a morphine pain pump in its back for chronic back disease.  There is some air in his colostomy bag.  Will start clear liquid    08/11/2020 postop day 3.  Shauna procedure.  Not sleeping well.  Urinating.  Ostomy output is beginning.  Tolerated clear liquids    08/12/2020.  Postop day 4.  Shauna procedure.  Slept better.  Urinating.  More stool out of the colostomy.  Tolerated full liquids.  Blood pressure is slightly    08/13/2020.  Postop day 5.  Shanua procedure.  Reported not sleeping well.  Able urinate.  Colostomy with stool output.  Patient and family have not yet learn to change the colostomy bag according to the wound care nurses evaluation.

## 2020-08-08 NOTE — ANESTHESIA PREPROCEDURE EVALUATION
08/07/2020  Henrique Vargas Jr. is a 74 y.o., male.    Anesthesia Evaluation    I have reviewed the Patient Summary Reports.    I have reviewed the Nursing Notes. I have reviewed the NPO Status.   I have reviewed the Medications.     Review of Systems  Anesthesia Hx:  No problems with previous Anesthesia Denies Hx of Anesthetic complications  Denies Family Hx of Anesthesia complications.   Denies Personal Hx of Anesthesia complications.   Social:  Non-Smoker, No Alcohol Use    Cardiovascular:  Cardiovascular Normal  ECG has been reviewed.    Pulmonary:  Pulmonary Normal    Renal/:  Renal/ Normal     Hepatic/GI:  Hepatic/GI Normal    Neurological:  Neurology Normal    Endocrine:  Endocrine Normal    Psych:  Psychiatric Normal         Patient Active Problem List   Diagnosis    Volvulus of sigmoid colon     No current facility-administered medications on file prior to encounter.      Current Outpatient Medications on File Prior to Encounter   Medication Sig Dispense Refill    acetaminophen (TYLENOL) 650 MG TbSR Take 650 mg by mouth every 8 (eight) hours.      alendronate (FOSAMAX) 70 MG tablet       atenolol (TENORMIN) 50 MG tablet Take 50 mg by mouth once daily.      gabapentin (NEURONTIN) 300 MG capsule       omeprazole (PRILOSEC) 40 MG capsule Take 40 mg by mouth.      oxyCODONE-acetaminophen (PERCOCET)  mg per tablet       paroxetine (PAXIL) 40 MG tablet Take 40 mg by mouth every morning.      tiZANidine (ZANAFLEX) 4 MG tablet       oxycodone (OXY-IR) 5 mg Cap Take 5 mg by mouth every 4 (four) hours as needed.       Past Surgical History:   Procedure Laterality Date    APPENDECTOMY      BACK SURGERY      PROSTATECTOMY           Physical Exam  General:  Well nourished    Airway/Jaw/Neck:  Airway Findings: Mouth Opening: Normal Tongue: Normal  General Airway Assessment: Adult  Mallampati:  II  TM Distance: 4 - 6 cm  Jaw/Neck Findings:  Neck ROM: Normal ROM      Dental:  Dental Findings: In tact   Chest/Lungs:  Chest/Lungs Findings: Clear to auscultation, Normal Respiratory Rate     Heart/Vascular:  Heart Findings: Rate: Bradycardia  Rhythm: Regular Rhythm  Sounds: Normal        Mental Status:  Mental Status Findings:  Cooperative, Alert and Oriented       Lab Results   Component Value Date    WBC 5.86 2020    HGB 10.2 (L) 2020    HCT 33.1 (L) 2020    MCV 95 2020     2020         Chemistry        Component Value Date/Time     2020 1443    K 3.5 2020 1443     2020 1443    CO2 29 2020 1443    BUN 9 2020 1443    CREATININE 0.9 2020 1443    GLU 99 2020 1443        Component Value Date/Time    CALCIUM 9.3 2020 1443    ALKPHOS 44 (L) 2020 1443    AST 27 2020 1443    ALT 13 2020 1443    BILITOT 0.4 2020 1443    ESTGFRAFRICA >60 2020 1443    EGFRNONAA >60 2020 1443        EC2020  Marked sinus bradycardia  Abnormal ECG  When compared with ECG of 09-OCT-2015 11:20,  Vent. rate has decreased BY 24 BPM      Anesthesia Plan  Type of Anesthesia, risks & benefits discussed:  Anesthesia Type:  general  Patient's Preference:   Intra-op Monitoring Plan: standard ASA monitors  Intra-op Monitoring Plan Comments:   Post Op Pain Control Plan: per primary service following discharge from PACU  Post Op Pain Control Plan Comments:   Induction:   IV  Beta Blocker:  Patient is on a Beta-Blocker and has received one dose within the past 24 hours (No further documentation required).       Informed Consent: Patient understands risks and agrees with Anesthesia plan.  Questions answered. Anesthesia consent signed with patient.  ASA Score: 2  emergent   Day of Surgery Review of History & Physical: I have interviewed and examined the patient. I have reviewed the patient's H&P dated:  There are no  significant changes.  H&P update referred to the surgeon.         Ready For Surgery From Anesthesia Perspective.

## 2020-08-08 NOTE — PLAN OF CARE
SW met with patient and his wife Jeannine at bedside to complete discharge planning assessment. Patient lives with his wife and states he is independent with his needs. He denies using any medical assistive equipment at the time. Patient denies any home health or outpatient services prior to his hospitalization. Patient denies a current lw/poa. No other CM needs identified at this time.  Patient denies any post hospital needs or services at this time. Transitional Care Folder, Discharge Planning Begins on Admission pamphlet, [facility] Bedside Delivery pamphlet, Advance Directive information given to patient. Sw placed contact information on white board. Sw instructed patient or family to call with any questions or concerns.      D/C plan: Home with family  Bjorn Warner MD  Pharm: [facility] [place]  Bedside: No  Transportation: Wife     08/08/20 1112   Discharge Assessment   Assessment Type Discharge Planning Assessment   Confirmed/corrected address and phone number on facesheet? Yes   Assessment information obtained from? Patient;Caregiver   Communicated expected length of stay with patient/caregiver yes   Prior to hospitilization cognitive status: Alert/Oriented   Prior to hospitalization functional status: Independent   Current cognitive status: Alert/Oriented   Current Functional Status: Assistive Equipment   Lives With spouse   Able to Return to Prior Arrangements yes   Is patient able to care for self after discharge? Yes   Who are your caregiver(s) and their phone number(s)? Sam Paez [phone]   Readmission Within the Last 30 Days no previous admission in last 30 days   Patient currently being followed by outpatient case management? No   Patient currently receives any other outside agency services? No   Equipment Currently Used at Home none   Do you have any problems affording any of your prescribed medications? No   Is the patient taking medications as prescribed? yes   Does the patient  have transportation home? Yes   Transportation Anticipated family or friend will provide   Does the patient receive services at the [facility]? No   Discharge Plan A Home with family   DME Needed Upon Discharge  none   Patient/Family in Agreement with Plan yes

## 2020-08-08 NOTE — CONSULTS
SW met with pt and his wife at bedside, pt stated they wish to decline home health services, their daughter is a nurse and will help with the pt's needs.      [facility] documented on choice form, placed in chart.

## 2020-08-08 NOTE — H&P
[facility] -   General Surgery  History & Physical    Patient Name: Henrique Vargas Jr.  MRN: [id number]  Admission Date: 8/7/2020  Attending Physician: Sincere Will MD   Primary Care Provider: Bjorn Warner MD    Patient information was obtained from patient, past medical records and ER records.     Subjective:     Chief Complaint/Reason for Admission:  Abdominal pain and sigmoid volvulus, sigmoid resection with colostomy    History of Present Illness: The patient is a 74-year-old male who has undergone colonoscopies were is found to have some diverticular changes.  Earlier today he underwent a Gastrografin or barium enema at the [facility] imaging center.  He had crampy abdominal pain and distension he presented to the emergency room.    The patient says that he is persistently bradycardic.  He denies any chest pain or shortness of breath.  He does have some left-sided abdominal pain    No current facility-administered medications on file prior to encounter.      Current Outpatient Medications on File Prior to Encounter   Medication Sig    acetaminophen (TYLENOL) 650 MG TbSR Take 650 mg by mouth every 8 (eight) hours.    alendronate (FOSAMAX) 70 MG tablet     atenolol (TENORMIN) 50 MG tablet Take 50 mg by mouth once daily.    gabapentin (NEURONTIN) 300 MG capsule     omeprazole (PRILOSEC) 40 MG capsule Take 40 mg by mouth.    oxyCODONE-acetaminophen (PERCOCET)  mg per tablet     paroxetine (PAXIL) 40 MG tablet Take 40 mg by mouth every morning.    tiZANidine (ZANAFLEX) 4 MG tablet     oxycodone (OXY-IR) 5 mg Cap Take 5 mg by mouth every 4 (four) hours as needed.       Review of patient's allergies indicates:  No Known Allergies    Past Medical History:   Diagnosis Date    Diverticulitis     Prostate cancer      Past Surgical History:   Procedure Laterality Date    BACK SURGERY       Family History     None        Tobacco Use    Smoking status: Never Smoker    Substance and Sexual Activity    Alcohol use: No    Drug use: No    Sexual activity: Not on file     Review of Systems   Constitutional: Negative.    HENT: Negative.    Eyes: Negative.    Respiratory: Negative.    Cardiovascular: Negative.    Gastrointestinal: Positive for abdominal distention and abdominal pain.   Endocrine: Negative.    Genitourinary: Negative.    Musculoskeletal: Negative.    Skin: Negative.    Allergic/Immunologic: Negative.    Neurological: Negative.    Hematological: Negative.    Psychiatric/Behavioral: Negative.      Objective:     Vital Signs (Most Recent):  Temp: 97.7 °F (36.5 °C) (08/07/20 1413)  Pulse: (!) 43 (08/07/20 1730)  Resp: 16 (08/07/20 1743)  BP: (!) 156/67 (08/07/20 1730)  SpO2: 97 % (08/07/20 1730) Vital Signs (24h Range):  Temp:  [97.7 °F (36.5 °C)] 97.7 °F (36.5 °C)  Pulse:  [39-49] 43  Resp:  [16-18] 16  SpO2:  [95 %-98 %] 97 %  BP: (149-175)/(67-85) 156/67     Weight: 59.9 kg (132 lb 0.9 oz)  Body mass index is 20.08 kg/m².    Physical Exam  Constitutional:       General: He is not in acute distress.     Appearance: He is well-developed.   HENT:      Head: Normocephalic and atraumatic.   Eyes:      General: No scleral icterus.     Pupils: Pupils are equal, round, and reactive to light.   Neck:      Musculoskeletal: Normal range of motion and neck supple.      Thyroid: No thyromegaly.      Vascular: No JVD.      Trachea: No tracheal deviation.   Cardiovascular:      Rate and Rhythm: Normal rate and regular rhythm.      Heart sounds: Normal heart sounds.   Pulmonary:      Effort: Pulmonary effort is normal.      Breath sounds: Normal breath sounds.   Abdominal:      General: Bowel sounds are normal. There is distension.      Palpations: Abdomen is soft. There is no mass.      Tenderness: There is abdominal tenderness (Left mid and lower abdomen). There is no guarding or rebound.      Comments: Well-healed lower midline and right lower quadrant incision   Musculoskeletal:  Normal range of motion.   Lymphadenopathy:      Cervical: No cervical adenopathy.   Skin:     General: Skin is warm and dry.      Capillary Refill: Capillary refill takes less than 2 seconds.   Neurological:      Mental Status: He is alert and oriented to person, place, and time.   Psychiatric:         Mood and Affect: Mood normal.         Behavior: Behavior normal.         Thought Content: Thought content normal.         Judgment: Judgment normal.         Significant Labs:  CBC:   Recent Labs   Lab 08/07/20  1443   WBC 5.86   RBC 3.49*   HGB 10.2*   HCT 33.1*      MCV 95   MCH 29.2   MCHC 30.8*     BMP:   Recent Labs   Lab 08/07/20  1443   GLU 99      K 3.5      CO2 29   BUN 9   CREATININE 0.9   CALCIUM 9.3     CMP:   Recent Labs   Lab 08/07/20  1443   GLU 99   CALCIUM 9.3   ALBUMIN 4.0   PROT 7.3      K 3.5   CO2 29      BUN 9   CREATININE 0.9   ALKPHOS 44*   ALT 13   AST 27   BILITOT 0.4     LFTs:   Recent Labs   Lab 08/07/20  1443   ALT 13   AST 27   ALKPHOS 44*   BILITOT 0.4   PROT 7.3   ALBUMIN 4.0     Coagulation: No results for input(s): LABPROT, INR, APTT in the last 168 hours.    Significant Diagnostics:  CT: I have reviewed all pertinent results/findings within the past 24 hours and my personal findings are:        Onofre Leonard MD  [phone] 8/7/2020 STAT      Narrative & Impression     EXAMINATION:  CT ABDOMEN PELVIS WITH CONTRAST     CLINICAL HISTORY:  Abdominal pain, acute, nonlocalized;     TECHNIQUE:  Low dose axial images, sagittal and coronal reformations were obtained from the lung bases to the pubic symphysis after  mL Omnipaque 350. Oral Omnipaque 350 also administered.     All CT scans at this location are performed using dose modulation techniques as appropriate to a performed exam including the following: Automated exposure control; adjustment of the mA and/or kV according to patient size.     COMPARISON:  10/09/2015 CT abdomen  pelvis     FINDINGS:  Heart: Normal in size. No pericardial effusion.     Lung Bases: Well aerated, without consolidation or pleural fluid.     Liver: Normal in size and attenuation, with no focal hepatic lesions.     Gallbladder: No calcified gallstones.     Bile Ducts: No evidence of dilated ducts.     Pancreas: Mild pancreatic atrophy.     Spleen: Unremarkable.     Adrenals: Unremarkable.     Kidneys/ Ureters: Nonobstructing left upper pole 3 mm renal stone.  No hydronephrosis.     Bladder: No evidence of wall thickening.     Reproductive organs: Evidence of prior prostatectomy.  Surgical clips in the pelvis.  Small volume free fluid also seen in the pelvis.     GI Tract/Mesentery: Large hiatal hernia containing the body and fundus of the stomach.  Duodenal diverticulum again noted..  Small bowel unremarkable.  Normal transit of oral contrast.  Gaseous distention of the right colon, transverse colon, descending colon up to 6.8 cm.  There is partial twisting of the sigmoid mesentery at the left lower quadrant suggesting early or developing sigmoid volvulus.  There is also wall thickening and mild inflammation of the sigmoid colon suggesting mild to moderate diverticulitis, with mild luminal narrowing.  Whether the colonic distension is related to the partial twisting of the sigmoid mesentery or the sigmoid diverticulitis is uncertain.  No abscess or free air.  No pneumatosis.     Appendix: Appendix is not well visualized.  No evidence of appendicitis.     Peritoneal Space: No free air.     Retroperitoneum: No significant adenopathy.     Abdominal wall: Unremarkable.     Vasculature: Aortoiliac  atherosclerotic calcification. No aneurysm.     Bones: Extensive thoracolumbar fixation hardware.  Lumbar neurostimulator device.  Bones appear osteopenic.     Impression:     There is partial twisting of the sigmoid mesentery at the left lower quadrant suggesting early or developing sigmoid volvulus. There is also wall  thickening, luminal narrowing, and mild inflammation of the lower sigmoid colon distal to the twisting, suggesting mild to moderate diverticulitis (comparison with prior study of 10/09/2015 suggests this may be a chronic or recurrent diverticulitis).  Whether the gaseous distension of the right colon, transverse colon, and descending colon is related to the partial twisting of the sigmoid mesentery, or to the sigmoid diverticulitis, is uncertain.  No abscess or free air.  No pneumatosis.              Assessment/Plan:     No notes have been filed under this hospital service.  Service: General Surgery    VTE Risk Mitigation (From admission, onward)    None          Sincere Will MD  General Surgery  [facility] -

## 2020-08-08 NOTE — TRANSFER OF CARE
"Anesthesia Transfer of Care Note    Patient: Henrique Vargas Jr.    Procedure(s) Performed: Procedure(s) (LRB):  EARLINE PROCEDURE (N/A)  COLECTOMY, SIGMOID (N/A)  CREATION, COLOSTOMY (N/A)    Patient location: PACU    Anesthesia Type: general    Transport from OR: Transported from OR on room air with adequate spontaneous ventilation    Post pain: adequate analgesia    Post assessment: no apparent anesthetic complications    Post vital signs: stable    Level of consciousness: awake    Complications: none    Transfer of care protocol was followed      Last vitals:   Visit Vitals  /68 (BP Location: Right arm, Patient Position: Lying)   Pulse (!) 36   Temp 36.5 °C (97.7 °F) (Oral)   Resp 20   Ht 5' 8" (1.727 m)   Wt 59.9 kg (132 lb 0.9 oz)   SpO2 96%   BMI 20.08 kg/m²     "

## 2020-08-08 NOTE — ED NOTES
Acknowledge transfer of care of patient. Patient resting in bed with no acute distress noted. Alert and oriented x 3,  and follows commands. Respirations easy and unlabored. IV site clear and patent. Able to make needs known, updated on plan of care. Cart in low position, side rails up x 2 and call bell in reach. Will continue to monitor

## 2020-08-08 NOTE — SUBJECTIVE & OBJECTIVE
No current facility-administered medications on file prior to encounter.      Current Outpatient Medications on File Prior to Encounter   Medication Sig    acetaminophen (TYLENOL) 650 MG TbSR Take 650 mg by mouth every 8 (eight) hours.    alendronate (FOSAMAX) 70 MG tablet     atenolol (TENORMIN) 50 MG tablet Take 50 mg by mouth once daily.    gabapentin (NEURONTIN) 300 MG capsule     omeprazole (PRILOSEC) 40 MG capsule Take 40 mg by mouth.    oxyCODONE-acetaminophen (PERCOCET)  mg per tablet     paroxetine (PAXIL) 40 MG tablet Take 40 mg by mouth every morning.    tiZANidine (ZANAFLEX) 4 MG tablet     oxycodone (OXY-IR) 5 mg Cap Take 5 mg by mouth every 4 (four) hours as needed.       Review of patient's allergies indicates:  No Known Allergies    Past Medical History:   Diagnosis Date    Diverticulitis     Prostate cancer      Past Surgical History:   Procedure Laterality Date    BACK SURGERY       Family History     None        Tobacco Use    Smoking status: Never Smoker   Substance and Sexual Activity    Alcohol use: No    Drug use: No    Sexual activity: Not on file     Review of Systems   Constitutional: Negative.    HENT: Negative.    Eyes: Negative.    Respiratory: Negative.    Cardiovascular: Negative.    Gastrointestinal: Positive for abdominal distention and abdominal pain.   Endocrine: Negative.    Genitourinary: Negative.    Musculoskeletal: Negative.    Skin: Negative.    Allergic/Immunologic: Negative.    Neurological: Negative.    Hematological: Negative.    Psychiatric/Behavioral: Negative.      Objective:     Vital Signs (Most Recent):  Temp: 97.7 °F (36.5 °C) (08/07/20 1413)  Pulse: (!) 43 (08/07/20 1730)  Resp: 16 (08/07/20 1743)  BP: (!) 156/67 (08/07/20 1730)  SpO2: 97 % (08/07/20 1730) Vital Signs (24h Range):  Temp:  [97.7 °F (36.5 °C)] 97.7 °F (36.5 °C)  Pulse:  [39-49] 43  Resp:  [16-18] 16  SpO2:  [95 %-98 %] 97 %  BP: (149-175)/(67-85) 156/67     Weight: 59.9 kg (132  lb 0.9 oz)  Body mass index is 20.08 kg/m².    Physical Exam  Constitutional:       General: He is not in acute distress.     Appearance: He is well-developed.   HENT:      Head: Normocephalic and atraumatic.   Eyes:      General: No scleral icterus.     Pupils: Pupils are equal, round, and reactive to light.   Neck:      Musculoskeletal: Normal range of motion and neck supple.      Thyroid: No thyromegaly.      Vascular: No JVD.      Trachea: No tracheal deviation.   Cardiovascular:      Rate and Rhythm: Normal rate and regular rhythm.      Heart sounds: Normal heart sounds.   Pulmonary:      Effort: Pulmonary effort is normal.      Breath sounds: Normal breath sounds.   Abdominal:      General: Bowel sounds are normal. There is distension.      Palpations: Abdomen is soft. There is no mass.      Tenderness: There is abdominal tenderness (Left mid and lower abdomen). There is no guarding or rebound.      Comments: Well-healed lower midline and right lower quadrant incision   Musculoskeletal: Normal range of motion.   Lymphadenopathy:      Cervical: No cervical adenopathy.   Skin:     General: Skin is warm and dry.      Capillary Refill: Capillary refill takes less than 2 seconds.   Neurological:      Mental Status: He is alert and oriented to person, place, and time.   Psychiatric:         Mood and Affect: Mood normal.         Behavior: Behavior normal.         Thought Content: Thought content normal.         Judgment: Judgment normal.         Significant Labs:  CBC:   Recent Labs   Lab 08/07/20  1443   WBC 5.86   RBC 3.49*   HGB 10.2*   HCT 33.1*      MCV 95   MCH 29.2   MCHC 30.8*     BMP:   Recent Labs   Lab 08/07/20  1443   GLU 99      K 3.5      CO2 29   BUN 9   CREATININE 0.9   CALCIUM 9.3     CMP:   Recent Labs   Lab 08/07/20  1443   GLU 99   CALCIUM 9.3   ALBUMIN 4.0   PROT 7.3      K 3.5   CO2 29      BUN 9   CREATININE 0.9   ALKPHOS 44*   ALT 13   AST 27   BILITOT 0.4      LFTs:   Recent Labs   Lab 08/07/20  1443   ALT 13   AST 27   ALKPHOS 44*   BILITOT 0.4   PROT 7.3   ALBUMIN 4.0     Coagulation: No results for input(s): LABPROT, INR, APTT in the last 168 hours.    Significant Diagnostics:  CT: I have reviewed all pertinent results/findings within the past 24 hours and my personal findings are:        Onofre Leonard MD  [phone] 8/7/2020 STAT      Narrative & Impression     EXAMINATION:  CT ABDOMEN PELVIS WITH CONTRAST     CLINICAL HISTORY:  Abdominal pain, acute, nonlocalized;     TECHNIQUE:  Low dose axial images, sagittal and coronal reformations were obtained from the lung bases to the pubic symphysis after  mL Omnipaque 350. Oral Omnipaque 350 also administered.     All CT scans at this location are performed using dose modulation techniques as appropriate to a performed exam including the following: Automated exposure control; adjustment of the mA and/or kV according to patient size.     COMPARISON:  10/09/2015 CT abdomen pelvis     FINDINGS:  Heart: Normal in size. No pericardial effusion.     Lung Bases: Well aerated, without consolidation or pleural fluid.     Liver: Normal in size and attenuation, with no focal hepatic lesions.     Gallbladder: No calcified gallstones.     Bile Ducts: No evidence of dilated ducts.     Pancreas: Mild pancreatic atrophy.     Spleen: Unremarkable.     Adrenals: Unremarkable.     Kidneys/ Ureters: Nonobstructing left upper pole 3 mm renal stone.  No hydronephrosis.     Bladder: No evidence of wall thickening.     Reproductive organs: Evidence of prior prostatectomy.  Surgical clips in the pelvis.  Small volume free fluid also seen in the pelvis.     GI Tract/Mesentery: Large hiatal hernia containing the body and fundus of the stomach.  Duodenal diverticulum again noted..  Small bowel unremarkable.  Normal transit of oral contrast.  Gaseous distention of the right colon, transverse colon, descending colon up to 6.8 cm.  There is  partial twisting of the sigmoid mesentery at the left lower quadrant suggesting early or developing sigmoid volvulus.  There is also wall thickening and mild inflammation of the sigmoid colon suggesting mild to moderate diverticulitis, with mild luminal narrowing.  Whether the colonic distension is related to the partial twisting of the sigmoid mesentery or the sigmoid diverticulitis is uncertain.  No abscess or free air.  No pneumatosis.     Appendix: Appendix is not well visualized.  No evidence of appendicitis.     Peritoneal Space: No free air.     Retroperitoneum: No significant adenopathy.     Abdominal wall: Unremarkable.     Vasculature: Aortoiliac  atherosclerotic calcification. No aneurysm.     Bones: Extensive thoracolumbar fixation hardware.  Lumbar neurostimulator device.  Bones appear osteopenic.     Impression:     There is partial twisting of the sigmoid mesentery at the left lower quadrant suggesting early or developing sigmoid volvulus. There is also wall thickening, luminal narrowing, and mild inflammation of the lower sigmoid colon distal to the twisting, suggesting mild to moderate diverticulitis (comparison with prior study of 10/09/2015 suggests this may be a chronic or recurrent diverticulitis).  Whether the gaseous distension of the right colon, transverse colon, and descending colon is related to the partial twisting of the sigmoid mesentery, or to the sigmoid diverticulitis, is uncertain.  No abscess or free air.  No pneumatosis.

## 2020-08-09 LAB
ANION GAP SERPL CALC-SCNC: 6 MMOL/L (ref 8–16)
ANISOCYTOSIS BLD QL SMEAR: SLIGHT
BASOPHILS # BLD AUTO: 0.03 K/UL (ref 0–0.2)
BASOPHILS NFR BLD: 0.4 % (ref 0–1.9)
BLD PROD TYP BPU: NORMAL
BLOOD UNIT EXPIRATION DATE: NORMAL
BLOOD UNIT TYPE CODE: 9500
BLOOD UNIT TYPE: NORMAL
BUN SERPL-MCNC: 17 MG/DL (ref 8–23)
CALCIUM SERPL-MCNC: 7.9 MG/DL (ref 8.7–10.5)
CHLORIDE SERPL-SCNC: 105 MMOL/L (ref 95–110)
CO2 SERPL-SCNC: 25 MMOL/L (ref 23–29)
CODING SYSTEM: NORMAL
CREAT SERPL-MCNC: 1 MG/DL (ref 0.5–1.4)
DIFFERENTIAL METHOD: ABNORMAL
DISPENSE STATUS: NORMAL
EOSINOPHIL # BLD AUTO: 0.1 K/UL (ref 0–0.5)
EOSINOPHIL NFR BLD: 1 % (ref 0–8)
ERYTHROCYTE [DISTWIDTH] IN BLOOD BY AUTOMATED COUNT: 15.9 % (ref 11.5–14.5)
EST. GFR  (AFRICAN AMERICAN): >60 ML/MIN/1.73 M^2
EST. GFR  (NON AFRICAN AMERICAN): >60 ML/MIN/1.73 M^2
GLUCOSE SERPL-MCNC: 77 MG/DL (ref 70–110)
HCT VFR BLD AUTO: 28.9 % (ref 40–54)
HGB BLD-MCNC: 8.9 G/DL (ref 14–18)
HYPOCHROMIA BLD QL SMEAR: ABNORMAL
IMM GRANULOCYTES # BLD AUTO: 0.1 K/UL (ref 0–0.04)
IMM GRANULOCYTES NFR BLD AUTO: 1.4 % (ref 0–0.5)
LYMPHOCYTES # BLD AUTO: 0.4 K/UL (ref 1–4.8)
LYMPHOCYTES NFR BLD: 5.4 % (ref 18–48)
MAGNESIUM SERPL-MCNC: 1.9 MG/DL (ref 1.6–2.6)
MCH RBC QN AUTO: 29.6 PG (ref 27–31)
MCHC RBC AUTO-ENTMCNC: 30.8 G/DL (ref 32–36)
MCV RBC AUTO: 96 FL (ref 82–98)
MONOCYTES # BLD AUTO: 0.2 K/UL (ref 0.3–1)
MONOCYTES NFR BLD: 2.8 % (ref 4–15)
NEUTROPHILS # BLD AUTO: 6.3 K/UL (ref 1.8–7.7)
NEUTROPHILS NFR BLD: 89 % (ref 38–73)
NRBC BLD-RTO: 0 /100 WBC
NUM UNITS TRANS PACKED RBC: NORMAL
OVALOCYTES BLD QL SMEAR: ABNORMAL
PHOSPHATE SERPL-MCNC: 2.4 MG/DL (ref 2.7–4.5)
PLATELET # BLD AUTO: 129 K/UL (ref 150–350)
PLATELET BLD QL SMEAR: ABNORMAL
PMV BLD AUTO: 10.6 FL (ref 9.2–12.9)
POIKILOCYTOSIS BLD QL SMEAR: SLIGHT
POTASSIUM SERPL-SCNC: 3.9 MMOL/L (ref 3.5–5.1)
RBC # BLD AUTO: 3.01 M/UL (ref 4.6–6.2)
SODIUM SERPL-SCNC: 136 MMOL/L (ref 136–145)
WBC # BLD AUTO: 7.06 K/UL (ref 3.9–12.7)

## 2020-08-09 PROCEDURE — 84100 ASSAY OF PHOSPHORUS: CPT

## 2020-08-09 PROCEDURE — 99233 SBSQ HOSP IP/OBS HIGH 50: CPT | Mod: ,,, | Performed by: INTERNAL MEDICINE

## 2020-08-09 PROCEDURE — 99233 PR SUBSEQUENT HOSPITAL CARE,LEVL III: ICD-10-PCS | Mod: ,,, | Performed by: INTERNAL MEDICINE

## 2020-08-09 PROCEDURE — 80048 BASIC METABOLIC PNL TOTAL CA: CPT

## 2020-08-09 PROCEDURE — 36430 TRANSFUSION BLD/BLD COMPNT: CPT

## 2020-08-09 PROCEDURE — 25000003 PHARM REV CODE 250: Performed by: SURGERY

## 2020-08-09 PROCEDURE — 11000001 HC ACUTE MED/SURG PRIVATE ROOM

## 2020-08-09 PROCEDURE — 97530 THERAPEUTIC ACTIVITIES: CPT | Mod: CQ

## 2020-08-09 PROCEDURE — 27000221 HC OXYGEN, UP TO 24 HOURS

## 2020-08-09 PROCEDURE — 94799 UNLISTED PULMONARY SVC/PX: CPT

## 2020-08-09 PROCEDURE — 36415 COLL VENOUS BLD VENIPUNCTURE: CPT

## 2020-08-09 PROCEDURE — P9016 RBC LEUKOCYTES REDUCED: HCPCS

## 2020-08-09 PROCEDURE — 85025 COMPLETE CBC W/AUTO DIFF WBC: CPT

## 2020-08-09 PROCEDURE — 83735 ASSAY OF MAGNESIUM: CPT

## 2020-08-09 PROCEDURE — 99900035 HC TECH TIME PER 15 MIN (STAT)

## 2020-08-09 PROCEDURE — 94761 N-INVAS EAR/PLS OXIMETRY MLT: CPT

## 2020-08-09 PROCEDURE — 63600175 PHARM REV CODE 636 W HCPCS: Performed by: SURGERY

## 2020-08-09 RX ADMIN — ENOXAPARIN SODIUM 40 MG: 30 INJECTION SUBCUTANEOUS at 04:08

## 2020-08-09 RX ADMIN — GABAPENTIN 300 MG: 300 CAPSULE ORAL at 08:08

## 2020-08-09 RX ADMIN — ACETAMINOPHEN 1000 MG: 500 TABLET ORAL at 09:08

## 2020-08-09 RX ADMIN — ACETAMINOPHEN 1000 MG: 500 TABLET ORAL at 06:08

## 2020-08-09 RX ADMIN — POTASSIUM CHLORIDE, DEXTROSE MONOHYDRATE AND SODIUM CHLORIDE: 150; 5; 450 INJECTION, SOLUTION INTRAVENOUS at 08:08

## 2020-08-09 RX ADMIN — TIZANIDINE 4 MG: 4 TABLET ORAL at 06:08

## 2020-08-09 RX ADMIN — GABAPENTIN 300 MG: 300 CAPSULE ORAL at 09:08

## 2020-08-09 RX ADMIN — PANTOPRAZOLE SODIUM 40 MG: 40 TABLET, DELAYED RELEASE ORAL at 08:08

## 2020-08-09 RX ADMIN — CHLORHEXIDINE GLUCONATE 0.12% ORAL RINSE 10 ML: 1.2 LIQUID ORAL at 09:08

## 2020-08-09 RX ADMIN — PAROXETINE HYDROCHLORIDE HEMIHYDRATE 40 MG: 20 TABLET, FILM COATED ORAL at 06:08

## 2020-08-09 RX ADMIN — ACETAMINOPHEN 1000 MG: 500 TABLET ORAL at 01:08

## 2020-08-09 RX ADMIN — TIZANIDINE 4 MG: 4 TABLET ORAL at 09:08

## 2020-08-09 RX ADMIN — HYDROMORPHONE HYDROCHLORIDE 0.5 MG: 1 INJECTION, SOLUTION INTRAMUSCULAR; INTRAVENOUS; SUBCUTANEOUS at 02:08

## 2020-08-09 RX ADMIN — POTASSIUM CHLORIDE, DEXTROSE MONOHYDRATE AND SODIUM CHLORIDE: 150; 5; 450 INJECTION, SOLUTION INTRAVENOUS at 06:08

## 2020-08-09 RX ADMIN — CHLORHEXIDINE GLUCONATE 0.12% ORAL RINSE 10 ML: 1.2 LIQUID ORAL at 08:08

## 2020-08-09 RX ADMIN — TIZANIDINE 4 MG: 4 TABLET ORAL at 01:08

## 2020-08-09 NOTE — PT/OT/SLP EVAL
Physical Therapy Evaluation    Patient Name:  Henrique Vargas Jr.   MRN:  [id number]    Recommendations:     Discharge Recommendations:  home(with family)   Discharge Equipment Recommendations: (may need RW)   Barriers to discharge: None    Assessment:     Henrique Vargas Jr. is a 74 y.o. male admitted with a medical diagnosis of Volvulus of sigmoid colon.  He presents with the following impairments/functional limitations:  weakness, gait instability, impaired endurance, impaired balance, decreased lower extremity function, decreased safety awareness, impaired self care skills, impaired functional mobilty, pain.    Rehab Prognosis: Good; patient would benefit from acute skilled PT services to address these deficits and reach maximum level of function.    Recent Surgery: Procedure(s) (LRB):  EARLINE PROCEDURE (N/A)  COLECTOMY, SIGMOID (N/A)  CREATION, COLOSTOMY (N/A) 2 Days Post-Op    Plan:     During this hospitalization, patient to be seen 5 x/week to address the identified rehab impairments via gait training, therapeutic activities, therapeutic exercises and progress toward the following goals:    · Plan of Care Expires:  08/15/20    Subjective     Chief Complaint: pain; fatigue  Patient/Family Comments/goals: to get leila; go home  Pain/Comfort:  ·      Patients cultural, spiritual, Mu-ism conflicts given the current situation:      Living Environment:  Lives with wife; one story home no steps to enter  Prior to admission, patients level of function was indep.  Equipment used at home: none.  DME owned (not currently used): none.  Upon discharge, patient will have assistance from family; may need hhpt - tbd.    Objective:     Communicated with RN prior to session.  Patient found HOB elevated with oxygen, peripheral IV, bed alarm, SCD, lora catheter(avasys)  upon PT entry to room.    General Precautions: Standard, fall, NPO, other (see comments)(except ice chips and sips of water with meds; colostomy)   Orthopedic  Precautions:N/A   Braces:       Exams:  · B LE ROM WFL and strength 4/5    Functional Mobility:  · Bed Mobility - supine to/from sit mod A and log-rolling technique to dec risk of pain exacerbation  · Transfers - sit to/from stand with RW min/cga and cues for safe hand placement  · Gt - Amb 50ft x 2 handheld A min A r/t unsteady gt; c/o fatigue on way back to room    Therapeutic Activities and Exercises:   PT educated patient/spouse on POC, B LE TE to prep mobility and safety/fall/colostomy precautions with mobiity; Patient will be safer with RW use for now    AM-PAC 6 CLICK MOBILITY  Total Score:      Patient left HOB elevated with all lines intact, call button in reach, bed alarm on, RN notified and spouse present.    GOALS:   Multidisciplinary Problems     Physical Therapy Goals        Problem: Physical Therapy Goal    Goal Priority Disciplines Outcome Goal Variances Interventions   Physical Therapy Goal     PT, PT/OT      Description: 1. Patient will perform supine to/from sit mod indep  2. Patient will perform sit to/from stand with least AD mod indep  3. Patient will ambulate 500ft least AD mod indep                   History:     Past Medical History:   Diagnosis Date    Diverticulitis     Prostate cancer        Past Surgical History:   Procedure Laterality Date    APPENDECTOMY      BACK SURGERY      PROSTATECTOMY         Time Tracking:     PT Received On: 08/08/20  PT Start Time: 1235     PT Stop Time: 1315  PT Total Time (min): 40 min     Billable Minutes: Evaluation 15, Therapeutic Activity 15 and Therapeutic Exercise 10      Darek Patrick, PT  08/09/2020

## 2020-08-09 NOTE — PROGRESS NOTES
[facility] -   General Surgery  Progress Note    Subjective:     History of Present Illness:  The patient is a 74-year-old male who has undergone colonoscopies were is found to have some diverticular changes.  Earlier today he underwent a Gastrografin or barium enema at the [facility] imaging center.  He had crampy abdominal pain and distension he presented to the emergency room.    The patient says that he is persistently bradycardic.  He denies any chest pain or shortness of breath.  He does have some left-sided abdominal pain    He was seen at a outpatient clinic abdominal film done there showed non specific bowel gas past    Post-Op Info:  Procedure(s) (LRB):  EARLINE PROCEDURE (N/A)  COLECTOMY, SIGMOID (N/A)  CREATION, COLOSTOMY (N/A)   2 Days Post-Op     Interval History:  Less pain.  Blood pressure has improved post transfusion.  Still is relatively bradycardic.  Pain controlled.  Good urine output    Medications:  Continuous Infusions:   dextrose 5 % and 0.45 % NaCl with KCl 20 mEq 100 mL/hr at 08/09/20 0842     Scheduled Meds:   acetaminophen  1,000 mg Oral Q8H    chlorhexidine  10 mL Mouth/Throat BID    enoxaparin  40 mg Subcutaneous Q24H    gabapentin  300 mg Oral BID    nozaseptin   Each Nostril BID    pantoprazole  40 mg Oral Daily    paroxetine  40 mg Oral QAM    tiZANidine  4 mg Oral Q8H     PRN Meds:sodium chloride, diphenhydrAMINE, HYDROmorphone, HYDROmorphone, metoclopramide HCl, ondansetron, sodium chloride 0.9%     Review of patient's allergies indicates:  No Known Allergies  Objective:     Vital Signs (Most Recent):  Temp: 97.8 °F (36.6 °C) (08/09/20 1412)  Pulse: 62 (08/09/20 1412)  Resp: 20 (08/09/20 1412)  BP: (!) 94/52 (08/09/20 1412)  SpO2: (!) 92 % (08/09/20 1412) Vital Signs (24h Range):  Temp:  [97.7 °F (36.5 °C)-98.7 °F (37.1 °C)] 97.8 °F (36.6 °C)  Pulse:  [60-66] 62  Resp:  [16-20] 20  SpO2:  [91 %-95 %] 92 %  BP: (77-97)/(48-68) 94/52     Weight:  56.4 kg (124 lb 5.4 oz)  Body mass index is 18.91 kg/m².    Intake/Output - Last 3 Shifts       08/07 0700 - 08/08 0659 08/08 0700 - 08/09 0659 08/09 0700 - 08/10 0659    I.V. (mL/kg) 2210 (39.2) 1675 (29.7)     Blood  664.6     IV Piggyback 100      Total Intake(mL/kg) 2310 (41) 2339.6 (41.5)     Urine (mL/kg/hr) 325 650 (0.5)     Stool 0 0     Blood 200      Total Output 525 650     Net +1785 +1689.6            Stool Occurrence 0 x            Physical Exam  Vitals signs and nursing note reviewed.   Constitutional:       Appearance: Normal appearance.   Cardiovascular:      Rate and Rhythm: Regular rhythm.      Pulses: Normal pulses.      Heart sounds: Normal heart sounds.      Comments: Bradycardic  Pulmonary:      Effort: Pulmonary effort is normal.   Abdominal:      General: Abdomen is flat. Bowel sounds are normal.      Palpations: Abdomen is soft.      Comments: Dressed incision.  Well-formed edematous colostomy   Skin:     General: Skin is warm and dry.   Neurological:      General: No focal deficit present.      Mental Status: He is alert. Mental status is at baseline.   Psychiatric:         Mood and Affect: Mood normal.         Behavior: Behavior normal.         Thought Content: Thought content normal.         Significant Labs:  CBC:   Recent Labs   Lab 08/09/20  0733   WBC 7.06   RBC 3.01*   HGB 8.9*   HCT 28.9*   *   MCV 96   MCH 29.6   MCHC 30.8*     BMP:   Recent Labs   Lab 08/09/20  0733   GLU 77      K 3.9      CO2 25   BUN 17   CREATININE 1.0   CALCIUM 7.9*   MG 1.9       Significant Diagnostics:  No new    Assessment/Plan:     * Volvulus of sigmoid colon  Colonic distension and the peritoneal volvulus is likely related to the area apply dry to doing a Gastrografin enema.      Diverticular stricture  Postop day 2 from a sigmoid resection and colostomy.    Transfuse 1 unit  Monitor blood pressure  Remove Mckeon catheter tomorrow.  Continue to monitor bernabe Will  MD  General Surgery  [facility]

## 2020-08-09 NOTE — NURSING
Pt Lora was not draining. Bladder scanned >330 mL. Advanced lora 1 inch and clear tea urine started draining with multiple bubbles noted. Notified Dr. Will. Will continue to monitor.

## 2020-08-09 NOTE — PLAN OF CARE
Pt Aox4 but barker has some confusion. PT is hypotensive and needs O2 to keep SATs >90%. Pt was given 0.5mg of dilaudid per MD Will approval. Mckeon in place with adequate output draining nupur colored urine.family at bedside most of shift.Pt remains stable and injury free throughout shift. Will continue to [facility]

## 2020-08-09 NOTE — PLAN OF CARE
Pt AOx4 but has moments of confusion. VSS. 2L NC. Family at BS. PT c/o after working with PT. PIV x1 to LFA. Midline BD incision dressing CDI. Mckeon in place with CYU. Pt remained injury free and stable during shift. Will continue to monitor

## 2020-08-09 NOTE — PT/OT/SLP PROGRESS
Physical Therapy  Treatment    Henrique Vargas Jr.   MRN: [id number]   Admitting Diagnosis: Volvulus of sigmoid colon    PT Received On: 08/09/20  PT Start Time: 1238     PT Stop Time: 1305    PT Total Time (min): 27 min       Billable Minutes:  Therapeutic Activity 27    Treatment Type: Treatment  PT/PTA: PTA     PTA Visit Number: 1       General Precautions: Standard, fall, NPO  Orthopedic Precautions: N/A   Braces:           Subjective:  Communicated with epic and nurse LaBaron. prior to session.  Pt found resting in bed with spouse at bedside , She reports that he had a VERY bad night, she appeared hesitant to me seeing him but I verified with nsg. And she was agreeable. Same with the patient. Agreeable but apprehensive as he was hurting.     Pain/Comfort  Pain Rating 1: 8/10  Location - Orientation 1: midline  Pain Addressed 1: Nurse notified, Distraction, Other (see comments)(abdominal bracing with a pillow)    Objective:   Patient found with: bed alarm, peripheral IV, SCD, oxygen, lora catheter(tele sitter)     ++PER NURSING, PATIENTS BP IS LOW , HE CAN NOT GIVE HIM ANYTHING FOR PAIN, YESTERDAY POST AMBULATION HE WAS HURTING A LOT. NURSE ADVISED ME NOT TO AMBULATE AND TO SCALE DOWN THE TX TODAY. +++++    Functional Mobility:  Bed Mobility:      Rolling: Min a ,    Sit to supine: MAX     Transfers:    Sit to stand: MIN A   Bed to chair: MIN A    Gait:     Deferred per nsg request          Therapeutic Activities and Exercises:  All of patients movements where very guarded. Braced abdomen with a pillow. Once EOB he reported no dizziness. Whimpering at times. STood and took steps to the chair. Encouraged to sit oob at least 30 min. Nurse tech notified of transfer status.      AM-PAC 6 CLICK MOBILITY  How much help from another person does this patient currently need?   1 = Unable, Total/Dependent Assistance  2 = A lot, Maximum/Moderate Assistance  3 = A little, Minimum/Contact Guard/Supervision  4 = None, Modified  [place]/Independent    Turning over in bed (including adjusting bedclothes, sheets and blankets)?: 3  Sitting down on and standing up from a chair with arms (e.g., wheelchair, bedside commode, etc.): 3  Moving from lying on back to sitting on the side of the bed?: 3  Moving to and from a bed to a chair (including a wheelchair)?: 3  Need to walk in hospital room?: 3  Climbing 3-5 steps with a railing?: 1  Basic Mobility Total Score: 16    AM-PAC Raw Score CMS G-Code Modifier Level of Impairment Assistance   6 % Total / Unable   7 - 9 CM 80 - 100% Maximal Assist   10 - 14 CL 60 - 80% Moderate Assist   15 - 19 CK 40 - 60% Moderate Assist   20 - 22 CJ 20 - 40% Minimal Assist   23 CI 1-20% SBA / CGA   24 CH 0% Independent/ Mod I     Patient left up in chair with all lines intact, call button in reach and nurse present.    Assessment:  Henrique Vargas Jr. is a 74 y.o. male with a medical diagnosis of Volvulus of sigmoid colon and presents with increased pain .    Rehab identified problem list/impairments: Rehab identified problem list/impairments: weakness, impaired self care skills, impaired balance, decreased safety awareness, decreased ROM, impaired joint extensibility, impaired functional mobilty, impaired endurance, gait instability, decreased lower extremity function, impaired cognition    Rehab potential is fair.    Activity tolerance: Fair    Discharge recommendations: Discharge Facility/Level of Care Needs: home     Barriers to discharge:      Equipment recommendations: Equipment Needed After Discharge: (may need a RW)     GOALS:   Multidisciplinary Problems     Physical Therapy Goals        Problem: Physical Therapy Goal    Goal Priority Disciplines Outcome Goal Variances Interventions   Physical Therapy Goal     PT, PT/OT      Description: 1. Patient will perform supine to/from sit mod indep  2. Patient will perform sit to/from stand with least AD mod indep  3. Patient will ambulate 500ft least AD mod  indep                   PLAN:    Patient to be seen 5 x/week  to address the above listed problems via gait training, therapeutic activities, therapeutic exercises  Plan of Care expires: 08/15/20  Plan of Care reviewed with: patient, spouse         Anahi Blanton, GAGAN  08/09/2020

## 2020-08-09 NOTE — ASSESSMENT & PLAN NOTE
BP and HR well controlled now  DC atenolol  If BP is elevated rec Norvasc 5 mg and can titrate    HOLD BP meds, f/u outpt

## 2020-08-09 NOTE — PLAN OF CARE
Bed mobility mod A; transfers min A RW; amb 50ft x 2 handheld A min A min unsteadiness; may need HHPT and RW - tbd closer to d/c

## 2020-08-09 NOTE — SUBJECTIVE & OBJECTIVE
Past Medical History:   Diagnosis Date    Diverticulitis     Prostate cancer        Past Surgical History:   Procedure Laterality Date    APPENDECTOMY      BACK SURGERY      PROSTATECTOMY         Review of patient's allergies indicates:  No Known Allergies    No current facility-administered medications on file prior to encounter.      Current Outpatient Medications on File Prior to Encounter   Medication Sig    acetaminophen (TYLENOL) 650 MG TbSR Take 650 mg by mouth every 8 (eight) hours.    alendronate (FOSAMAX) 70 MG tablet     atenolol (TENORMIN) 50 MG tablet Take 50 mg by mouth once daily.    gabapentin (NEURONTIN) 300 MG capsule     omeprazole (PRILOSEC) 40 MG capsule Take 40 mg by mouth.    oxyCODONE-acetaminophen (PERCOCET)  mg per tablet     paroxetine (PAXIL) 40 MG tablet Take 40 mg by mouth every morning.    tiZANidine (ZANAFLEX) 4 MG tablet     oxycodone (OXY-IR) 5 mg Cap Take 5 mg by mouth every 4 (four) hours as needed.     Family History     None        Tobacco Use    Smoking status: Never Smoker   Substance and Sexual Activity    Alcohol use: No    Drug use: No    Sexual activity: Not on file     Review of Systems   Constitution: Positive for decreased appetite and malaise/fatigue.   HENT: Negative.    Eyes: Negative.    Cardiovascular: Negative.    Respiratory: Negative.    Endocrine: Negative.    Hematologic/Lymphatic: Negative.    Skin: Negative.    Musculoskeletal: Negative.    Gastrointestinal: Positive for abdominal pain and change in bowel habit.   Genitourinary: Negative.    Neurological: Negative.    Psychiatric/Behavioral: Negative.    Allergic/Immunologic: Negative.      Objective:     Vital Signs (Most Recent):  Temp: 97.8 °F (36.6 °C) (08/09/20 1412)  Pulse: 62 (08/09/20 1412)  Resp: 20 (08/09/20 1431)  BP: (!) 94/52 (08/09/20 1412)  SpO2: (!) 92 % (08/09/20 1412) Vital Signs (24h Range):  Temp:  [97.7 °F (36.5 °C)-98.7 °F (37.1 °C)] 97.8 °F (36.6 °C)  Pulse:   [60-66] 62  Resp:  [16-20] 20  SpO2:  [91 %-95 %] 92 %  BP: (77-97)/(48-68) 94/52     Weight: 56.4 kg (124 lb 5.4 oz)  Body mass index is 18.91 kg/m².    SpO2: (!) 92 %  O2 Device (Oxygen Therapy): nasal cannula      Intake/Output Summary (Last 24 hours) at 8/9/2020 1438  Last data filed at 8/9/2020 1437  Gross per 24 hour   Intake 2339.58 ml   Output 1350 ml   Net 989.58 ml       Lines/Drains/Airways     Drain                 Colostomy 08/07/20 2252 LUQ 1 day         Urethral Catheter 08/07/20 2115 Straight-tip;Latex 16 Fr. 1 day          Peripheral Intravenous Line                 Peripheral IV - Single Lumen 08/07/20 2308 18 G Left Forearm 1 day                Physical Exam   Constitutional: He is oriented to person, place, and time. He appears well-developed and well-nourished. No distress.   HENT:   Head: Normocephalic and atraumatic.   Nose: Nose normal.   Mouth/Throat: Oropharynx is clear and moist.   Eyes: Conjunctivae and EOM are normal. No scleral icterus.   Neck: Normal range of motion. Neck supple. No JVD present. No thyromegaly present.   Cardiovascular: Normal rate, regular rhythm, S1 normal and S2 normal. Exam reveals no gallop, no S3, no S4 and no friction rub.   No murmur heard.  Pulmonary/Chest: Effort normal and breath sounds normal. No stridor. No respiratory distress. He has no wheezes. He has no rales. He exhibits no tenderness.   Abdominal: He exhibits no distension and no mass. There is abdominal tenderness. There is no rebound.   Genitourinary:    Genitourinary Comments: Deferred     Musculoskeletal: Normal range of motion.         General: No tenderness, deformity or edema.   Lymphadenopathy:     He has no cervical adenopathy.   Neurological: He is alert and oriented to person, place, and time. He exhibits normal muscle tone. Coordination normal.   Skin: Skin is warm and dry. No rash noted. He is not diaphoretic. No erythema. No pallor.   Psychiatric: He has a normal mood and affect. His  behavior is normal. Judgment and thought content normal.   Nursing note and vitals reviewed.      Significant Labs:   All pertinent lab results from the last 24 hours have been reviewed. and   Recent Lab Results       08/09/20  0733   08/08/20  2230        Anion Gap 6       Aniso Slight Slight     Baso # 0.03 0.02     Basophil% 0.4 0.3     BUN, Bld 17       Calcium 7.9       Chloride 105       CO2 25       Creatinine 1.0       Differential Method Automated Automated     eGFR if  >60       eGFR if non  >60  Comment:  Calculation used to obtain the estimated glomerular filtration  rate (eGFR) is the CKD-EPI equation.          Eos # 0.1 0.0     Eosinophil% 1.0 0.0     Glucose 77       Gran # (ANC) 6.3 5.2     Gran% 89.0 88.8     Hematocrit 28.9 25.8     Hemoglobin 8.9 7.8     Hypo Occasional Occasional     Immature Grans (Abs) 0.10  Comment:  Mild elevation in immature granulocytes is non specific and   can be seen in a variety of conditions including stress response,   acute inflammation, trauma and pregnancy. Correlation with other   laboratory and clinical findings is essential.   0.06  Comment:  Mild elevation in immature granulocytes is non specific and   can be seen in a variety of conditions including stress response,   acute inflammation, trauma and pregnancy. Correlation with other   laboratory and clinical findings is essential.       Immature Granulocytes 1.4 1.0     Lymph # 0.4 0.4     Lymph% 5.4 6.7     Magnesium 1.9       MCH 29.6 29.4     MCHC 30.8 30.2     MCV 96 97     Mono # 0.2 0.2     Mono% 2.8 3.2     MPV 10.6 10.1     nRBC 0 0     Ovalocytes Occasional Occasional     Phosphorus 2.4       Platelet Estimate Appears normal Appears normal     Platelets 129 135     Poik Slight Slight     Potassium 3.9       RBC 3.01 2.65     RDW 15.9 14.6     Sodium 136       WBC 7.06 5.85

## 2020-08-09 NOTE — HOSPITAL COURSE
HPI: Henrique Vargas . is a 74 y.o. male pt with HTN, HLD, GERD, depression, back pain presented for abd pain he is s/p sigmoid resection and colostomy. Pt seen by cardiologist Dr. Wong last year, had SANTOS had stress ECHO with low risk, has been on atenolol presumably for HTN, pt had episodes of bradycardia overnight, stopped BB now. He feels well cardiac wise. Discussed will stop BB and monitor BP. No CP/SOB.     8/9/20 - s/p PRBC tranfusion, pt was hypotensive which improved. This AM feels tired but improved, no CP/SOB. He was on BB for HTN confirmed per wife. Hgb 8.9 this AM.

## 2020-08-09 NOTE — ASSESSMENT & PLAN NOTE
Postop day 2 from a sigmoid resection and colostomy.    Transfuse 1 unit  Monitor blood pressure  Remove Mckeon catheter tomorrow.  Continue to monitor left

## 2020-08-09 NOTE — SUBJECTIVE & OBJECTIVE
Interval History:  Less pain.  Blood pressure has improved post transfusion.  Still is relatively bradycardic.  Pain controlled.  Good urine output    Medications:  Continuous Infusions:   dextrose 5 % and 0.45 % NaCl with KCl 20 mEq 100 mL/hr at 08/09/20 0842     Scheduled Meds:   acetaminophen  1,000 mg Oral Q8H    chlorhexidine  10 mL Mouth/Throat BID    enoxaparin  40 mg Subcutaneous Q24H    gabapentin  300 mg Oral BID    nozaseptin   Each Nostril BID    pantoprazole  40 mg Oral Daily    paroxetine  40 mg Oral QAM    tiZANidine  4 mg Oral Q8H     PRN Meds:sodium chloride, diphenhydrAMINE, HYDROmorphone, HYDROmorphone, metoclopramide HCl, ondansetron, sodium chloride 0.9%     Review of patient's allergies indicates:  No Known Allergies  Objective:     Vital Signs (Most Recent):  Temp: 97.8 °F (36.6 °C) (08/09/20 1412)  Pulse: 62 (08/09/20 1412)  Resp: 20 (08/09/20 1412)  BP: (!) 94/52 (08/09/20 1412)  SpO2: (!) 92 % (08/09/20 1412) Vital Signs (24h Range):  Temp:  [97.7 °F (36.5 °C)-98.7 °F (37.1 °C)] 97.8 °F (36.6 °C)  Pulse:  [60-66] 62  Resp:  [16-20] 20  SpO2:  [91 %-95 %] 92 %  BP: (77-97)/(48-68) 94/52     Weight: 56.4 kg (124 lb 5.4 oz)  Body mass index is 18.91 kg/m².    Intake/Output - Last 3 Shifts       08/07 0700 - 08/08 0659 08/08 0700 - 08/09 0659 08/09 0700 - 08/10 0659    I.V. (mL/kg) 2210 (39.2) 1675 (29.7)     Blood  664.6     IV Piggyback 100      Total Intake(mL/kg) 2310 (41) 2339.6 (41.5)     Urine (mL/kg/hr) 325 650 (0.5)     Stool 0 0     Blood 200      Total Output 525 650     Net +1785 +1689.6            Stool Occurrence 0 x            Physical Exam  Vitals signs and nursing note reviewed.   Constitutional:       Appearance: Normal appearance.   Cardiovascular:      Rate and Rhythm: Regular rhythm.      Pulses: Normal pulses.      Heart sounds: Normal heart sounds.      Comments: Bradycardic  Pulmonary:      Effort: Pulmonary effort is normal.   Abdominal:      General: Abdomen  is flat. Bowel sounds are normal.      Palpations: Abdomen is soft.      Comments: Dressed incision.  Well-formed edematous colostomy   Skin:     General: Skin is warm and dry.   Neurological:      General: No focal deficit present.      Mental Status: He is alert. Mental status is at baseline.   Psychiatric:         Mood and Affect: Mood normal.         Behavior: Behavior normal.         Thought Content: Thought content normal.         Significant Labs:  CBC:   Recent Labs   Lab 08/09/20  0733   WBC 7.06   RBC 3.01*   HGB 8.9*   HCT 28.9*   *   MCV 96   MCH 29.6   MCHC 30.8*     BMP:   Recent Labs   Lab 08/09/20  0733   GLU 77      K 3.9      CO2 25   BUN 17   CREATININE 1.0   CALCIUM 7.9*   MG 1.9       Significant Diagnostics:  No new

## 2020-08-09 NOTE — PROGRESS NOTES
[facility] -   Cardiology  Progress Note    Patient Name: Henrique Vargas Jr.  MRN: [id number]  Admission Date: 8/7/2020  Hospital Length of Stay: 2 days  Code Status: Full Code   Attending Physician: Sincere Will MD   Primary Care Physician: Bjorn Warner MD  Expected Discharge Date:   Principal Problem:Volvulus of sigmoid colon    Subjective:     Hospital Course:   HPI: Henrique Vargas Jr. is a 74 y.o. male pt with HTN, HLD, GERD, depression, back pain presented for abd pain he is s/p sigmoid resection and colostomy. Pt seen by cardiologist Dr. Wong last year, had SANTOS had stress ECHO with low risk, has been on atenolol presumably for HTN, pt had episodes of bradycardia overnight, stopped BB now. He feels well cardiac wise. Discussed will stop BB and monitor BP. No CP/SOB.     8/9/20 - s/p PRBC tranfusion, pt was hypotensive which improved. This AM feels tired but improved, no CP/SOB. He was on BB for HTN confirmed per wife. Hgb 8.9 this AM.     Past Medical History:   Diagnosis Date    Diverticulitis     Prostate cancer        Past Surgical History:   Procedure Laterality Date    APPENDECTOMY      BACK SURGERY      PROSTATECTOMY         Review of patient's allergies indicates:  No Known Allergies    No current facility-administered medications on file prior to encounter.      Current Outpatient Medications on File Prior to Encounter   Medication Sig    acetaminophen (TYLENOL) 650 MG TbSR Take 650 mg by mouth every 8 (eight) hours.    alendronate (FOSAMAX) 70 MG tablet     atenolol (TENORMIN) 50 MG tablet Take 50 mg by mouth once daily.    gabapentin (NEURONTIN) 300 MG capsule     omeprazole (PRILOSEC) 40 MG capsule Take 40 mg by mouth.    oxyCODONE-acetaminophen (PERCOCET)  mg per tablet     paroxetine (PAXIL) 40 MG tablet Take 40 mg by mouth every morning.    tiZANidine (ZANAFLEX) 4 MG tablet     oxycodone (OXY-IR) 5 mg Cap Take 5 mg by mouth every 4 (four) hours as needed.      Family History     None        Tobacco Use    Smoking status: Never Smoker   Substance and Sexual Activity    Alcohol use: No    Drug use: No    Sexual activity: Not on file     Review of Systems   Constitution: Positive for decreased appetite and malaise/fatigue.   HENT: Negative.    Eyes: Negative.    Cardiovascular: Negative.    Respiratory: Negative.    Endocrine: Negative.    Hematologic/Lymphatic: Negative.    Skin: Negative.    Musculoskeletal: Negative.    Gastrointestinal: Positive for abdominal pain and change in bowel habit.   Genitourinary: Negative.    Neurological: Negative.    Psychiatric/Behavioral: Negative.    Allergic/Immunologic: Negative.      Objective:     Vital Signs (Most Recent):  Temp: 97.8 °F (36.6 °C) (08/09/20 1412)  Pulse: 62 (08/09/20 1412)  Resp: 20 (08/09/20 1431)  BP: (!) 94/52 (08/09/20 1412)  SpO2: (!) 92 % (08/09/20 1412) Vital Signs (24h Range):  Temp:  [97.7 °F (36.5 °C)-98.7 °F (37.1 °C)] 97.8 °F (36.6 °C)  Pulse:  [60-66] 62  Resp:  [16-20] 20  SpO2:  [91 %-95 %] 92 %  BP: (77-97)/(48-68) 94/52     Weight: 56.4 kg (124 lb 5.4 oz)  Body mass index is 18.91 kg/m².    SpO2: (!) 92 %  O2 Device (Oxygen Therapy): nasal cannula      Intake/Output Summary (Last 24 hours) at 8/9/2020 1438  Last data filed at 8/9/2020 1437  Gross per 24 hour   Intake 2339.58 ml   Output 1350 ml   Net 989.58 ml       Lines/Drains/Airways     Drain                 Colostomy 08/07/20 2252 LUQ 1 day         Urethral Catheter 08/07/20 2115 Straight-tip;Latex 16 Fr. 1 day          Peripheral Intravenous Line                 Peripheral IV - Single Lumen 08/07/20 2308 18 G Left Forearm 1 day                Physical Exam   Constitutional: He is oriented to person, place, and time. He appears well-developed and well-nourished. No distress.   HENT:   Head: Normocephalic and atraumatic.   Nose: Nose normal.   Mouth/Throat: Oropharynx is clear and moist.   Eyes: Conjunctivae and EOM are normal. No scleral  icterus.   Neck: Normal range of motion. Neck supple. No JVD present. No thyromegaly present.   Cardiovascular: Normal rate, regular rhythm, S1 normal and S2 normal. Exam reveals no gallop, no S3, no S4 and no friction rub.   No murmur heard.  Pulmonary/Chest: Effort normal and breath sounds normal. No stridor. No respiratory distress. He has no wheezes. He has no rales. He exhibits no tenderness.   Abdominal: He exhibits no distension and no mass. There is abdominal tenderness. There is no rebound.   Genitourinary:    Genitourinary Comments: Deferred     Musculoskeletal: Normal range of motion.         General: No tenderness, deformity or edema.   Lymphadenopathy:     He has no cervical adenopathy.   Neurological: He is alert and oriented to person, place, and time. He exhibits normal muscle tone. Coordination normal.   Skin: Skin is warm and dry. No rash noted. He is not diaphoretic. No erythema. No pallor.   Psychiatric: He has a normal mood and affect. His behavior is normal. Judgment and thought content normal.   Nursing note and vitals reviewed.      Significant Labs:   All pertinent lab results from the last 24 hours have been reviewed. and   Recent Lab Results       08/09/20  0733   08/08/20  2230        Anion Gap 6       Aniso Slight Slight     Baso # 0.03 0.02     Basophil% 0.4 0.3     BUN, Bld 17       Calcium 7.9       Chloride 105       CO2 25       Creatinine 1.0       Differential Method Automated Automated     eGFR if  >60       eGFR if non  >60  Comment:  Calculation used to obtain the estimated glomerular filtration  rate (eGFR) is the CKD-EPI equation.          Eos # 0.1 0.0     Eosinophil% 1.0 0.0     Glucose 77       Gran # (ANC) 6.3 5.2     Gran% 89.0 88.8     Hematocrit 28.9 25.8     Hemoglobin 8.9 7.8     Hypo Occasional Occasional     Immature Grans (Abs) 0.10  Comment:  Mild elevation in immature granulocytes is non specific and   can be seen in a variety of  conditions including stress response,   acute inflammation, trauma and pregnancy. Correlation with other   laboratory and clinical findings is essential.   0.06  Comment:  Mild elevation in immature granulocytes is non specific and   can be seen in a variety of conditions including stress response,   acute inflammation, trauma and pregnancy. Correlation with other   laboratory and clinical findings is essential.       Immature Granulocytes 1.4 1.0     Lymph # 0.4 0.4     Lymph% 5.4 6.7     Magnesium 1.9       MCH 29.6 29.4     MCHC 30.8 30.2     MCV 96 97     Mono # 0.2 0.2     Mono% 2.8 3.2     MPV 10.6 10.1     nRBC 0 0     Ovalocytes Occasional Occasional     Phosphorus 2.4       Platelet Estimate Appears normal Appears normal     Platelets 129 135     Poik Slight Slight     Potassium 3.9       RBC 3.01 2.65     RDW 15.9 14.6     Sodium 136       WBC 7.06 5.85             Assessment and Plan:         * Volvulus of sigmoid colon  Cont tx per gen surg    Essential hypertension  BP and HR well controlled now  DC atenolol  If BP is elevated rec Norvasc 5 mg and can titrate    HOLD BP meds, f/u outpt    Diverticular stricture  S/p sigmoid resection and colostomy  Cont tx per gen surg    Call with changes in CV status, will see PRN; can f/u outpt     VTE Risk Mitigation (From admission, onward)         Ordered     enoxaparin injection 40 mg  Every 24 hours      08/08/20 0012     IP VTE HIGH RISK PATIENT  Once      08/08/20 0012     Place sequential compression device  Until discontinued      08/08/20 0012                Earl Encinas Md, MD  Cardiology  [facility]

## 2020-08-09 NOTE — PLAN OF CARE
IV fluids administered as ordered. PRN bolus ordered for BP. 1 unit of packed RBC transfused this shift, pt tolerated well. Oxygen 2L via NC. Mckeon in place hanging to gravity. Afebrile. Minimal drainage noted to midline incision, area marked. Colostomy, CDI. Cardiac monitor NSR. Afebrile. No acute adverse events noted. Pt remained free from falls this shift. Will continue to monitor. Chart reviewed.

## 2020-08-10 PROBLEM — E83.39 HYPOPHOSPHATEMIA: Status: ACTIVE | Noted: 2020-08-10

## 2020-08-10 PROCEDURE — 97530 THERAPEUTIC ACTIVITIES: CPT

## 2020-08-10 PROCEDURE — 63600175 PHARM REV CODE 636 W HCPCS: Performed by: SURGERY

## 2020-08-10 PROCEDURE — 97110 THERAPEUTIC EXERCISES: CPT

## 2020-08-10 PROCEDURE — 94799 UNLISTED PULMONARY SVC/PX: CPT

## 2020-08-10 PROCEDURE — 96372 THER/PROPH/DIAG INJ SC/IM: CPT

## 2020-08-10 PROCEDURE — 97116 GAIT TRAINING THERAPY: CPT

## 2020-08-10 PROCEDURE — 25000003 PHARM REV CODE 250: Performed by: SURGERY

## 2020-08-10 PROCEDURE — 99900035 HC TECH TIME PER 15 MIN (STAT)

## 2020-08-10 PROCEDURE — 11000001 HC ACUTE MED/SURG PRIVATE ROOM

## 2020-08-10 PROCEDURE — 27000221 HC OXYGEN, UP TO 24 HOURS

## 2020-08-10 PROCEDURE — 94761 N-INVAS EAR/PLS OXIMETRY MLT: CPT

## 2020-08-10 PROCEDURE — 21400001 HC TELEMETRY ROOM

## 2020-08-10 RX ORDER — HYDROCODONE BITARTRATE AND ACETAMINOPHEN 5; 325 MG/1; MG/1
1 TABLET ORAL EVERY 6 HOURS PRN
Status: DISCONTINUED | OUTPATIENT
Start: 2020-08-10 | End: 2020-08-13 | Stop reason: HOSPADM

## 2020-08-10 RX ORDER — SODIUM,POTASSIUM PHOSPHATES 280-250MG
1 POWDER IN PACKET (EA) ORAL ONCE
Status: COMPLETED | OUTPATIENT
Start: 2020-08-10 | End: 2020-08-10

## 2020-08-10 RX ORDER — HYDROCODONE BITARTRATE AND ACETAMINOPHEN 7.5; 325 MG/1; MG/1
1 TABLET ORAL EVERY 6 HOURS PRN
Status: DISCONTINUED | OUTPATIENT
Start: 2020-08-10 | End: 2020-08-13 | Stop reason: HOSPADM

## 2020-08-10 RX ADMIN — CHLORHEXIDINE GLUCONATE 0.12% ORAL RINSE 10 ML: 1.2 LIQUID ORAL at 09:08

## 2020-08-10 RX ADMIN — ACETAMINOPHEN 1000 MG: 500 TABLET ORAL at 01:08

## 2020-08-10 RX ADMIN — TIZANIDINE 4 MG: 4 TABLET ORAL at 01:08

## 2020-08-10 RX ADMIN — GABAPENTIN 300 MG: 300 CAPSULE ORAL at 08:08

## 2020-08-10 RX ADMIN — TIZANIDINE 4 MG: 4 TABLET ORAL at 09:08

## 2020-08-10 RX ADMIN — POTASSIUM CHLORIDE, DEXTROSE MONOHYDRATE AND SODIUM CHLORIDE: 150; 5; 450 INJECTION, SOLUTION INTRAVENOUS at 06:08

## 2020-08-10 RX ADMIN — PAROXETINE HYDROCHLORIDE HEMIHYDRATE 40 MG: 20 TABLET, FILM COATED ORAL at 06:08

## 2020-08-10 RX ADMIN — POTASSIUM CHLORIDE, DEXTROSE MONOHYDRATE AND SODIUM CHLORIDE: 150; 5; 450 INJECTION, SOLUTION INTRAVENOUS at 04:08

## 2020-08-10 RX ADMIN — CHLORHEXIDINE GLUCONATE 0.12% ORAL RINSE 10 ML: 1.2 LIQUID ORAL at 08:08

## 2020-08-10 RX ADMIN — POTASSIUM & SODIUM PHOSPHATES POWDER PACK 280-160-250 MG 1 PACKET: 280-160-250 PACK at 10:08

## 2020-08-10 RX ADMIN — ACETAMINOPHEN 1000 MG: 500 TABLET ORAL at 09:08

## 2020-08-10 RX ADMIN — PANTOPRAZOLE SODIUM 40 MG: 40 TABLET, DELAYED RELEASE ORAL at 08:08

## 2020-08-10 RX ADMIN — TIZANIDINE 4 MG: 4 TABLET ORAL at 05:08

## 2020-08-10 RX ADMIN — GABAPENTIN 300 MG: 300 CAPSULE ORAL at 09:08

## 2020-08-10 RX ADMIN — HYDROCODONE BITARTRATE AND ACETAMINOPHEN 1 TABLET: 7.5; 325 TABLET ORAL at 04:08

## 2020-08-10 RX ADMIN — HYDROCODONE BITARTRATE AND ACETAMINOPHEN 1 TABLET: 7.5; 325 TABLET ORAL at 10:08

## 2020-08-10 RX ADMIN — ENOXAPARIN SODIUM 40 MG: 30 INJECTION SUBCUTANEOUS at 04:08

## 2020-08-10 NOTE — CONSULTS
"Consulted to this 74 year old male patient for colostomy care/education. Patient dx with volvulus of sigmoid colon, colostomy placed 8/7. Patient is drowsy, having difficulty staying awake for visit. His wife is present at the bedside. LUQ colostomy in place with surgical pouch intact with good seal noted. Small amount of bowel sweat again noted in pouch. New Ileostomy education continued at this time using teach back method. Education reinforced on: emptying and resealing pouch, how and when; changing pouch, how and when; hygiene, activity, and dietary guidelines. Patient's wife reports that patient is typically independent, ambulatory. Plans made to revisit tomorrow for further education and pouch change if patient is more awake.    Please review Cristy's procedure "Pouching : Colostomy or Ileostomy" for instructions on ostomy.stoma care. Change ostomy appliance weekly or IMMEDIATELY IF LEAKING. All ostomy care supplies can be requested from materials management.    OSTOMY CARE SUPPLIES:  One Piece Flat Zac Pouch #[id number]   One Piece Sensura Convex Pouch #[id number]  Brava Stoma Ring #[id number]   Sharon Ostomy Paste #[id number]   Cavilon Spray #[id number]    Ostomy Powder #[id number]   Ostomy Belt #[id number]        "

## 2020-08-10 NOTE — ASSESSMENT & PLAN NOTE
Postop day 3 from a sigmoid resection and colostomy.      Monitor blood pressure  Remove Mckeon catheter   Clear liquids  Oral narcotics

## 2020-08-10 NOTE — PROGRESS NOTES
[facility] -   General Surgery  Progress Note    Subjective:     History of Present Illness:  The patient is a 74-year-old male who has undergone colonoscopies were is found to have some diverticular changes.  Earlier today he underwent a Gastrografin or barium enema at the [facility] imaging center.  He had crampy abdominal pain and distension he presented to the emergency room.    The patient says that he is persistently bradycardic.  He denies any chest pain or shortness of breath.  He does have some left-sided abdominal pain    He was seen at a outpatient clinic abdominal film done there showed non specific bowel gas past    Post-Op Info:  Procedure(s) (LRB):  EARLINE PROCEDURE (N/A)  COLECTOMY, SIGMOID (N/A)  CREATION, COLOSTOMY (N/A)   3 Days Post-Op     Interval History:  Less pain.  Blood pressure has improved post transfusion.  Still is relatively bradycardic.  Pain controlled.  Good urine output    Medications:  Continuous Infusions:   dextrose 5 % and 0.45 % NaCl with KCl 20 mEq 100 mL/hr at 08/09/20 0842     Scheduled Meds:   acetaminophen  1,000 mg Oral Q8H    chlorhexidine  10 mL Mouth/Throat BID    enoxaparin  40 mg Subcutaneous Q24H    gabapentin  300 mg Oral BID    nozaseptin   Each Nostril BID    pantoprazole  40 mg Oral Daily    paroxetine  40 mg Oral QAM    tiZANidine  4 mg Oral Q8H     PRN Meds:sodium chloride, diphenhydrAMINE, HYDROmorphone, HYDROmorphone, metoclopramide HCl, ondansetron, sodium chloride 0.9%     Review of patient's allergies indicates:  No Known Allergies  Objective:     Vital Signs (Most Recent):  Temp: 97.8 °F (36.6 °C) (08/09/20 1412)  Pulse: 62 (08/09/20 1412)  Resp: 20 (08/09/20 1412)  BP: (!) 94/52 (08/09/20 1412)  SpO2: (!) 92 % (08/09/20 1412) Vital Signs (24h Range):  Temp:  [97.7 °F (36.5 °C)-98.7 °F (37.1 °C)] 97.8 °F (36.6 °C)  Pulse:  [60-66] 62  Resp:  [16-20] 20  SpO2:  [91 %-95 %] 92 %  BP: (77-97)/(48-68) 94/52     Weight:  56.4 kg (124 lb 5.4 oz)  Body mass index is 18.91 kg/m².    Intake/Output - Last 3 Shifts       08/07 0700 - 08/08 0659 08/08 0700 - 08/09 0659 08/09 0700 - 08/10 0659    I.V. (mL/kg) 2210 (39.2) 1675 (29.7)     Blood  664.6     IV Piggyback 100      Total Intake(mL/kg) 2310 (41) 2339.6 (41.5)     Urine (mL/kg/hr) 325 650 (0.5)     Stool 0 0     Blood 200      Total Output 525 650     Net +1785 +1689.6            Stool Occurrence 0 x            Physical Exam  Vitals signs and nursing note reviewed.   Constitutional:       Appearance: Normal appearance.   Cardiovascular:      Rate and Rhythm: Regular rhythm.      Pulses: Normal pulses.      Heart sounds: Normal heart sounds.      Comments: Bradycardic  Pulmonary:      Effort: Pulmonary effort is normal.   Abdominal:      General: Abdomen is flat. Bowel sounds are normal.      Palpations: Abdomen is soft.      Comments: Dressed incision.  Well-formed edematous colostomy   Skin:     General: Skin is warm and dry.   Neurological:      General: No focal deficit present.      Mental Status: He is alert. Mental status is at baseline.   Psychiatric:         Mood and Affect: Mood normal.         Behavior: Behavior normal.         Thought Content: Thought content normal.         Significant Labs:  CBC:   Recent Labs   Lab 08/09/20  0733   WBC 7.06   RBC 3.01*   HGB 8.9*   HCT 28.9*   *   MCV 96   MCH 29.6   MCHC 30.8*     BMP:   Recent Labs   Lab 08/09/20  0733   GLU 77      K 3.9      CO2 25   BUN 17   CREATININE 1.0   CALCIUM 7.9*   MG 1.9       Significant Diagnostics:  No new    Assessment/Plan:     * Volvulus of sigmoid colon  Colonic distension and the peritoneal volvulus is likely related to the area apply dry to doing a Gastrografin enema.      Diverticular stricture  Postop day 2 from a sigmoid resection and colostomy.    Transfuse 1 unit  Monitor blood pressure  Remove Mckeon catheter tomorrow.  Continue to monitor bernabe Will  MD  General Surgery  [facility]

## 2020-08-10 NOTE — NURSING
Mckeon catheter removed per MD order. Pt tolerated well, catheter intact. Due to void by 1200. Pt educated. Will inform day shift nurse. Will continue to monitor.

## 2020-08-10 NOTE — PLAN OF CARE
IV fluids administered as ordered. Oxygen 2.5 mL via NC. Minimal drainage noted to midline incision, area marked. Colostomy, CDI, brown thin liquid noted in bag. Tele monitor noted. VSS. Afebrile. Cardiac monitor NSR. No acute adverse events noted. Pt remained free from falls this shift. Will continue to monitor. Chart reviewed.

## 2020-08-10 NOTE — SUBJECTIVE & OBJECTIVE
Interval History:  Patient complains of incisional pain.  His blood pressure has improved.  His Mckeon catheter is been removed.  We will start clear liquids.  At oral narcotics    Medications:  Continuous Infusions:   dextrose 5 % and 0.45 % NaCl with KCl 20 mEq 80 mL/hr at 08/10/20 0853     Scheduled Meds:   acetaminophen  1,000 mg Oral Q8H    chlorhexidine  10 mL Mouth/Throat BID    enoxaparin  40 mg Subcutaneous Q24H    gabapentin  300 mg Oral BID    nozaseptin   Each Nostril BID    pantoprazole  40 mg Oral Daily    paroxetine  40 mg Oral QAM    tiZANidine  4 mg Oral Q8H     PRN Meds:sodium chloride, diphenhydrAMINE, HYDROcodone-acetaminophen, HYDROcodone-acetaminophen, HYDROmorphone, metoclopramide HCl, ondansetron, sodium chloride 0.9%     Review of patient's allergies indicates:  No Known Allergies  Objective:     Vital Signs (Most Recent):  Temp: 97.6 °F (36.4 °C) (08/10/20 0758)  Pulse: (!) 56 (08/10/20 0758)  Resp: 18 (08/10/20 0758)  BP: 121/62 (08/10/20 0758)  SpO2: (!) 94 % (08/10/20 0758) Vital Signs (24h Range):  Temp:  [97.6 °F (36.4 °C)-98.6 °F (37 °C)] 97.6 °F (36.4 °C)  Pulse:  [56-68] 56  Resp:  [16-20] 18  SpO2:  [92 %-95 %] 94 %  BP: ()/(52-65) 121/62     Weight: 56.4 kg (124 lb 5.4 oz)  Body mass index is 18.91 kg/m².    Intake/Output - Last 3 Shifts       08/08 0700 - 08/09 0659 08/09 0700 - 08/10 0659 08/10 0700 - 08/11 0659    I.V. (mL/kg) 1675 (29.7) 1325 (23.5)     Blood 664.6      IV Piggyback       Total Intake(mL/kg) 2339.6 (41.5) 1325 (23.5)     Urine (mL/kg/hr) 650 (0.5) 1650 (1.2)     Stool 0 150     Blood       Total Output 650 1800     Net +1689.6 -475                  Physical Exam  Vitals signs and nursing note reviewed.   Constitutional:       Appearance: Normal appearance.      Comments: Thin   Eyes:      General: No scleral icterus.  Cardiovascular:      Rate and Rhythm: Regular rhythm.      Comments: Bradycardia  Pulmonary:      Effort: Pulmonary effort is  normal.      Breath sounds: Normal breath sounds.   Abdominal:      General: Abdomen is flat. Bowel sounds are normal.      Palpations: Abdomen is soft.      Comments: Incision and clean.  Edematous left-sided colostomy   Skin:     General: Skin is warm and dry.      Capillary Refill: Capillary refill takes less than 2 seconds.   Neurological:      General: No focal deficit present.      Mental Status: He is alert.   Psychiatric:         Mood and Affect: Mood normal.         Behavior: Behavior normal.         Thought Content: Thought content normal.         Significant Labs:  CBC:   Recent Labs   Lab 08/09/20  0733   WBC 7.06   RBC 3.01*   HGB 8.9*   HCT 28.9*   *   MCV 96   MCH 29.6   MCHC 30.8*     BMP:   Recent Labs   Lab 08/09/20  0733   GLU 77      K 3.9      CO2 25   BUN 17   CREATININE 1.0   CALCIUM 7.9*   MG 1.9       Significant Diagnostics:  None

## 2020-08-10 NOTE — PHYSICIAN QUERY
PT Name: Henrique Vargas Jr.  MR #: [id number]    HEMATOLOGY CLARIFICATION      CDS/: Liane Thakkar               Contact information: [email or web address]    This form is a permanent document in the medical record.      Query Date: August 10, 2020    By submitting this query, we are merely seeking further clarification of documentation. Please utilize your independent clinical judgment when addressing the question(s) below.    The Medical Record contains the following:   Indicators  Supporting Clinical Findings Location in Medical Record    Anemia documented     x H&H   Hemoglobin 10.2  10.8  9.4   7.8  8.9     Hematocrit 33.1  35.7   30.8   25.8  28.9       Labs, CBC 08/07 - 08/09           x BP                    HR  Blood pressure has improved post transfusion.  Still is relatively bradycardic     General Surgery PN 08/09    GI bleeding documented      Acute bleeding (Non GI site)     x Transfusion(s) Transfuse 1 unit     General Surgery PN 08/09, Blood Bank    Acute/Chronic illness      Treatments     x Other Sigmoid diverticulitis causing the colon to kink/twist on itself causing partial obstruction and dilation/partial volvulus  Procedure:  Shauna procedure  Sigmoid colectomy  Colostomy creation  Estimated blood loss: 200 mL     Op Note     Provider, please specify diagnosis or diagnoses associated with above clinical findings.   [ x  ] Acute blood loss anemia , from his surgical procedure   [   ] Acute blood loss anemia expected post-operatively    [   ] Anemia, unspecified    [   ] Other Hematological Diagnosis (please specify): _________________   [   ] Clinically Undetermined     Present on admission (POA) status:   [   ] Yes (Y)                          [  ] Clinically Undetermined (W)  [   ] No (N)                            [   ] Documentation insufficient to determine if condition is POA (U)          Please document in your progress notes daily for the duration of treatment, until resolved,  and include in your discharge summary.

## 2020-08-10 NOTE — PROGRESS NOTES
[facility] -   General Surgery  Progress Note    Subjective:     History of Present Illness:  The patient is a 74-year-old male who has undergone colonoscopies were is found to have some diverticular changes.  Earlier today he underwent a Gastrografin or barium enema at the [facility] imaging center.  He had crampy abdominal pain and distension he presented to the emergency room.    The patient says that he is persistently bradycardic.  He denies any chest pain or shortness of breath.  He does have some left-sided abdominal pain    He was seen at a outpatient clinic abdominal film done there showed non specific bowel gas past    Post-Op Info:  Procedure(s) (LRB):  EARLINE PROCEDURE (N/A)  COLECTOMY, SIGMOID (N/A)  CREATION, COLOSTOMY (N/A)   3 Days Post-Op     Interval History:  Patient complains of incisional pain.  His blood pressure has improved.  His Mckeon catheter is been removed.  We will start clear liquids.  At oral narcotics    Medications:  Continuous Infusions:   dextrose 5 % and 0.45 % NaCl with KCl 20 mEq 80 mL/hr at 08/10/20 0853     Scheduled Meds:   acetaminophen  1,000 mg Oral Q8H    chlorhexidine  10 mL Mouth/Throat BID    enoxaparin  40 mg Subcutaneous Q24H    gabapentin  300 mg Oral BID    nozaseptin   Each Nostril BID    pantoprazole  40 mg Oral Daily    paroxetine  40 mg Oral QAM    tiZANidine  4 mg Oral Q8H     PRN Meds:sodium chloride, diphenhydrAMINE, HYDROcodone-acetaminophen, HYDROcodone-acetaminophen, HYDROmorphone, metoclopramide HCl, ondansetron, sodium chloride 0.9%     Review of patient's allergies indicates:  No Known Allergies  Objective:     Vital Signs (Most Recent):  Temp: 97.6 °F (36.4 °C) (08/10/20 0758)  Pulse: (!) 56 (08/10/20 0758)  Resp: 18 (08/10/20 0758)  BP: 121/62 (08/10/20 0758)  SpO2: (!) 94 % (08/10/20 0758) Vital Signs (24h Range):  Temp:  [97.6 °F (36.4 °C)-98.6 °F (37 °C)] 97.6 °F (36.4 °C)  Pulse:  [56-68] 56  Resp:  [16-20]  18  SpO2:  [92 %-95 %] 94 %  BP: ()/(52-65) 121/62     Weight: 56.4 kg (124 lb 5.4 oz)  Body mass index is 18.91 kg/m².    Intake/Output - Last 3 Shifts       08/08 0700 - 08/09 0659 08/09 0700 - 08/10 0659 08/10 0700 - 08/11 0659    I.V. (mL/kg) 1675 (29.7) 1325 (23.5)     Blood 664.6      IV Piggyback       Total Intake(mL/kg) 2339.6 (41.5) 1325 (23.5)     Urine (mL/kg/hr) 650 (0.5) 1650 (1.2)     Stool 0 150     Blood       Total Output 650 1800     Net +1689.6 -475                  Physical Exam  Vitals signs and nursing note reviewed.   Constitutional:       Appearance: Normal appearance.      Comments: Thin   Eyes:      General: No scleral icterus.  Cardiovascular:      Rate and Rhythm: Regular rhythm.      Comments: Bradycardia  Pulmonary:      Effort: Pulmonary effort is normal.      Breath sounds: Normal breath sounds.   Abdominal:      General: Abdomen is flat. Bowel sounds are normal.      Palpations: Abdomen is soft.      Comments: Incision and clean.  Edematous left-sided colostomy   Skin:     General: Skin is warm and dry.      Capillary Refill: Capillary refill takes less than 2 seconds.   Neurological:      General: No focal deficit present.      Mental Status: He is alert.   Psychiatric:         Mood and Affect: Mood normal.         Behavior: Behavior normal.         Thought Content: Thought content normal.         Significant Labs:  CBC:   Recent Labs   Lab 08/09/20  0733   WBC 7.06   RBC 3.01*   HGB 8.9*   HCT 28.9*   *   MCV 96   MCH 29.6   MCHC 30.8*     BMP:   Recent Labs   Lab 08/09/20  0733   GLU 77      K 3.9      CO2 25   BUN 17   CREATININE 1.0   CALCIUM 7.9*   MG 1.9       Significant Diagnostics:  None    Assessment/Plan:     * Volvulus of sigmoid colon  Colonic distension and the peritoneal volvulus is likely related to the area apply dry to doing a Gastrografin enema.      Hypophosphatemia  Replace fossa    Diverticular stricture  Postop day 3 from a sigmoid  resection and colostomy.      Monitor blood pressure  Remove Mckeon catheter   Clear liquids  Oral narcotics        Sincere Will MD  General Surgery  [facility]

## 2020-08-10 NOTE — PT/OT/SLP PROGRESS
Physical Therapy  Treatment    Henrique Vargas Jr.   MRN: [id number]   Admitting Diagnosis: Volvulus of sigmoid colon    PT Received On: 08/08/20  PT Start Time: 0900     PT Stop Time: 0940    PT Total Time (min): 40 min       Billable Minutes:  Gait Training 10, Therapeutic Activity 20 and Therapeutic Exercise 10    Treatment Type: Treatment  PT/PTA: PT     PTA Visit Number: 1       General Precautions: Standard, fall  Orthopedic Precautions: N/A   Braces: N/A         Subjective:  Communicated with RN prior to session. Agreed to PT; dtr present for session      Pain/Comfort  Pain Rating 1: 5/10  Location - Orientation 1: generalized  Location 1: abdomen  Pain Addressed 1: Reposition, Cessation of Activity, Distraction  Pain Rating Post-Intervention 1: 0/10(at rest)    Objective:   Patient found with: bed alarm, oxygen    Functional Mobility:  Bed Mobility: supine to sit via log-rolling to L side and with use of bed rail with min A and inc time needed       Transfers: sit to/from stand with RW and cga for safety/vc's for safe hand placement; bed to chair pivot transfer cga no AD       Gait: Patient amb 80ft RW cga for safe rw use and balance r/t min unsteadiness; min sob after gt; O2 re-donned       Stairs:  n/a    Balance:   Static Sit: G  Dynamic Sit: F+  Static Stand: F with RW  Dynamic stand: F with RW      Therapeutic Activities and Exercises:  PT educated patient on POC, B LE TE while seated on EOB with vc's/PT demo for correct technique and safety/fall precautions with mobility using RW     AM-PAC 6 CLICK MOBILITY  How much help from another person does this patient currently need?   1 = Unable, Total/Dependent Assistance  2 = A lot, Maximum/Moderate Assistance  3 = A little, Minimum/Contact Guard/Supervision  4 = None, Modified [place]/Independent    Turning over in bed (including adjusting bedclothes, sheets and blankets)?: 3  Sitting down on and standing up from a chair with arms (e.g., wheelchair, bedside  commode, etc.): 3  Moving from lying on back to sitting on the side of the bed?: 3  Moving to and from a bed to a chair (including a wheelchair)?: 3  Need to walk in hospital room?: 3  Climbing 3-5 steps with a railing?: 1  Basic Mobility Total Score: 16    AM-PAC Raw Score CMS G-Code Modifier Level of Impairment Assistance   6 % Total / Unable   7 - 9 CM 80 - 100% Maximal Assist   10 - 14 CL 60 - 80% Moderate Assist   15 - 19 CK 40 - 60% Moderate Assist   20 - 22 CJ 20 - 40% Minimal Assist   23 CI 1-20% SBA / CGA   24 CH 0% Independent/ Mod I     Patient left up in chair with all lines intact, call button in reach, chair alarm on, RN notified and wife present.    Assessment:  Henrique Vargas Jr. is a 74 y.o. male with a medical diagnosis of Volvulus of sigmoid colon and presents with impaired mobility/dec act adriane.    Rehab identified problem list/impairments: Rehab identified problem list/impairments: weakness, impaired endurance, impaired self care skills, decreased lower extremity function, impaired functional mobilty, gait instability, decreased safety awareness, pain, impaired balance    Rehab potential is good.    Activity tolerance: Fair    Discharge recommendations: Discharge Facility/Level of Care Needs: home health PT     Barriers to discharge:      Equipment recommendations: Equipment Needed After Discharge: walker, rolling     GOALS:   Multidisciplinary Problems     Physical Therapy Goals        Problem: Physical Therapy Goal    Goal Priority Disciplines Outcome Goal Variances Interventions   Physical Therapy Goal     PT, PT/OT Ongoing, Progressing     Description: 1. Patient will perform supine to/from sit mod indep  2. Patient will perform sit to/from stand with least AD mod indep  3. Patient will ambulate 500ft least AD mod indep                   PLAN:    Patient to be seen 5 x/week  to address the above listed problems via gait training, therapeutic activities, therapeutic exercises  Plan of  Care expires: 08/15/20  Plan of Care reviewed with: patient, spouse         Darek Patrick, PT  08/10/2020

## 2020-08-10 NOTE — PLAN OF CARE
Problem: Fall Injury Risk  Goal: Absence of Fall and Fall-Related Injury  Outcome: Ongoing, Progressing     Problem: Adult Inpatient Plan of Care  Goal: Plan of Care Review  Outcome: Ongoing, Progressing  Goal: Patient-Specific Goal (Individualization)  Outcome: Ongoing, Progressing  Goal: Absence of Hospital-Acquired Illness or Injury  Outcome: Ongoing, Progressing  Goal: Optimal Comfort and Wellbeing  Outcome: Ongoing, Progressing  Goal: Readiness for Transition of Care  Outcome: Ongoing, Progressing  Goal: Rounds/Family Conference  Outcome: Ongoing, Progressing     Problem: Infection  Goal: Infection Symptom Resolution  Outcome: Ongoing, Progressing     Problem: Skin Injury Risk Increased  Goal: Skin Health and Integrity  Outcome: Ongoing, Progressing     Problem: Pain Acute  Goal: Optimal Pain Control  Outcome: Ongoing, Progressing

## 2020-08-10 NOTE — NURSING
Spoke with Maile Vásquez (daughter) [phone] and provided update. Daughter wishes to be called about changes in pt's status.

## 2020-08-11 LAB
ANION GAP SERPL CALC-SCNC: 11 MMOL/L (ref 8–16)
BASOPHILS # BLD AUTO: 0.04 K/UL (ref 0–0.2)
BASOPHILS NFR BLD: 0.4 % (ref 0–1.9)
BUN SERPL-MCNC: 10 MG/DL (ref 8–23)
CALCIUM SERPL-MCNC: 8.9 MG/DL (ref 8.7–10.5)
CHLORIDE SERPL-SCNC: 105 MMOL/L (ref 95–110)
CO2 SERPL-SCNC: 19 MMOL/L (ref 23–29)
CREAT SERPL-MCNC: 0.8 MG/DL (ref 0.5–1.4)
DIFFERENTIAL METHOD: ABNORMAL
EOSINOPHIL # BLD AUTO: 0.5 K/UL (ref 0–0.5)
EOSINOPHIL NFR BLD: 5.6 % (ref 0–8)
ERYTHROCYTE [DISTWIDTH] IN BLOOD BY AUTOMATED COUNT: 15.2 % (ref 11.5–14.5)
EST. GFR  (AFRICAN AMERICAN): >60 ML/MIN/1.73 M^2
EST. GFR  (NON AFRICAN AMERICAN): >60 ML/MIN/1.73 M^2
GLUCOSE SERPL-MCNC: 91 MG/DL (ref 70–110)
HCT VFR BLD AUTO: 32.7 % (ref 40–54)
HGB BLD-MCNC: 10 G/DL (ref 14–18)
IMM GRANULOCYTES # BLD AUTO: 0.03 K/UL (ref 0–0.04)
IMM GRANULOCYTES NFR BLD AUTO: 0.3 % (ref 0–0.5)
LYMPHOCYTES # BLD AUTO: 0.5 K/UL (ref 1–4.8)
LYMPHOCYTES NFR BLD: 5.4 % (ref 18–48)
MAGNESIUM SERPL-MCNC: 1.7 MG/DL (ref 1.6–2.6)
MCH RBC QN AUTO: 29.3 PG (ref 27–31)
MCHC RBC AUTO-ENTMCNC: 30.6 G/DL (ref 32–36)
MCV RBC AUTO: 96 FL (ref 82–98)
MONOCYTES # BLD AUTO: 0.4 K/UL (ref 0.3–1)
MONOCYTES NFR BLD: 4.4 % (ref 4–15)
NEUTROPHILS # BLD AUTO: 7.7 K/UL (ref 1.8–7.7)
NEUTROPHILS NFR BLD: 83.9 % (ref 38–73)
NRBC BLD-RTO: 0 /100 WBC
PHOSPHATE SERPL-MCNC: 1.8 MG/DL (ref 2.7–4.5)
PLATELET # BLD AUTO: 152 K/UL (ref 150–350)
PMV BLD AUTO: 10.4 FL (ref 9.2–12.9)
POTASSIUM SERPL-SCNC: 4 MMOL/L (ref 3.5–5.1)
RBC # BLD AUTO: 3.41 M/UL (ref 4.6–6.2)
SODIUM SERPL-SCNC: 135 MMOL/L (ref 136–145)
WBC # BLD AUTO: 9.24 K/UL (ref 3.9–12.7)

## 2020-08-11 PROCEDURE — 80048 BASIC METABOLIC PNL TOTAL CA: CPT

## 2020-08-11 PROCEDURE — 27000221 HC OXYGEN, UP TO 24 HOURS

## 2020-08-11 PROCEDURE — 97116 GAIT TRAINING THERAPY: CPT

## 2020-08-11 PROCEDURE — 84100 ASSAY OF PHOSPHORUS: CPT

## 2020-08-11 PROCEDURE — 63600175 PHARM REV CODE 636 W HCPCS: Performed by: SURGERY

## 2020-08-11 PROCEDURE — 36415 COLL VENOUS BLD VENIPUNCTURE: CPT

## 2020-08-11 PROCEDURE — 21400001 HC TELEMETRY ROOM

## 2020-08-11 PROCEDURE — 85025 COMPLETE CBC W/AUTO DIFF WBC: CPT

## 2020-08-11 PROCEDURE — 25000003 PHARM REV CODE 250: Performed by: SURGERY

## 2020-08-11 PROCEDURE — 97110 THERAPEUTIC EXERCISES: CPT

## 2020-08-11 PROCEDURE — 96372 THER/PROPH/DIAG INJ SC/IM: CPT

## 2020-08-11 PROCEDURE — 11000001 HC ACUTE MED/SURG PRIVATE ROOM

## 2020-08-11 PROCEDURE — 83735 ASSAY OF MAGNESIUM: CPT

## 2020-08-11 PROCEDURE — 94761 N-INVAS EAR/PLS OXIMETRY MLT: CPT

## 2020-08-11 PROCEDURE — 99900035 HC TECH TIME PER 15 MIN (STAT)

## 2020-08-11 RX ORDER — TALC
6 POWDER (GRAM) TOPICAL NIGHTLY PRN
Status: DISCONTINUED | OUTPATIENT
Start: 2020-08-11 | End: 2020-08-13 | Stop reason: HOSPADM

## 2020-08-11 RX ORDER — BISACODYL 5 MG
10 TABLET, DELAYED RELEASE (ENTERIC COATED) ORAL ONCE
Status: COMPLETED | OUTPATIENT
Start: 2020-08-11 | End: 2020-08-11

## 2020-08-11 RX ORDER — SODIUM,POTASSIUM PHOSPHATES 280-250MG
1 POWDER IN PACKET (EA) ORAL 2 TIMES DAILY
Status: DISCONTINUED | OUTPATIENT
Start: 2020-08-11 | End: 2020-08-13 | Stop reason: HOSPADM

## 2020-08-11 RX ADMIN — TIZANIDINE 4 MG: 4 TABLET ORAL at 09:08

## 2020-08-11 RX ADMIN — PANTOPRAZOLE SODIUM 40 MG: 40 TABLET, DELAYED RELEASE ORAL at 08:08

## 2020-08-11 RX ADMIN — HYDROCODONE BITARTRATE AND ACETAMINOPHEN 1 TABLET: 7.5; 325 TABLET ORAL at 08:08

## 2020-08-11 RX ADMIN — PAROXETINE HYDROCHLORIDE HEMIHYDRATE 40 MG: 20 TABLET, FILM COATED ORAL at 06:08

## 2020-08-11 RX ADMIN — POTASSIUM & SODIUM PHOSPHATES POWDER PACK 280-160-250 MG 1 PACKET: 280-160-250 PACK at 08:08

## 2020-08-11 RX ADMIN — TIZANIDINE 4 MG: 4 TABLET ORAL at 05:08

## 2020-08-11 RX ADMIN — ENOXAPARIN SODIUM 40 MG: 30 INJECTION SUBCUTANEOUS at 04:08

## 2020-08-11 RX ADMIN — GABAPENTIN 300 MG: 300 CAPSULE ORAL at 08:08

## 2020-08-11 RX ADMIN — HYDROCODONE BITARTRATE AND ACETAMINOPHEN 1 TABLET: 7.5; 325 TABLET ORAL at 09:08

## 2020-08-11 RX ADMIN — POTASSIUM & SODIUM PHOSPHATES POWDER PACK 280-160-250 MG 1 PACKET: 280-160-250 PACK at 09:08

## 2020-08-11 RX ADMIN — CHLORHEXIDINE GLUCONATE 0.12% ORAL RINSE 10 ML: 1.2 LIQUID ORAL at 09:08

## 2020-08-11 RX ADMIN — CHLORHEXIDINE GLUCONATE 0.12% ORAL RINSE 10 ML: 1.2 LIQUID ORAL at 08:08

## 2020-08-11 RX ADMIN — GABAPENTIN 300 MG: 300 CAPSULE ORAL at 09:08

## 2020-08-11 RX ADMIN — POTASSIUM CHLORIDE, DEXTROSE MONOHYDRATE AND SODIUM CHLORIDE: 150; 5; 450 INJECTION, SOLUTION INTRAVENOUS at 04:08

## 2020-08-11 RX ADMIN — TIZANIDINE 4 MG: 4 TABLET ORAL at 01:08

## 2020-08-11 RX ADMIN — BISACODYL 10 MG: 5 TABLET, COATED ORAL at 08:08

## 2020-08-11 NOTE — PROGRESS NOTES
"Consulted to this 74 year old male patient for colostomy care/education. Patient dx with volvulus of sigmoid colon, colostomy placed 8/7. Patient is more awake today but still slightly confused. His wife is present at the bedside. LUQ colostomy in place with surgical pouch intact with good seal noted. Small amount of bowel sweat again noted in pouch. Pouch removed so teaching could be performed. Cleansed peristomal area with water and gauze. Stoma is red and moist with some mucosal slough, measures 30mm. Sprayed peristomal area with cavilon. Ostomy ring applied around stoma. One piece Zac pouch then cut to fit and applied to patient. All performed while explaining steps to patient and wife. Neither patient and wife participated well in process. Patient would not look down at stoma but stated he was "following". Wife reports she has a "weak stomach and can't look". Encouragement provided. New ileostomy education continued at this time using teach back method. Education reinforced on: emptying and resealing pouch, how and when; changing pouch, how and when; hygiene, activity, and dietary guidelines. Plans made to revisit tomorrow.    "

## 2020-08-11 NOTE — PT/OT/SLP PROGRESS
Physical Therapy  Treatment    Henrique Vargas Jr.   MRN: [id number]   Admitting Diagnosis: Volvulus of sigmoid colon    PT Received On: 08/11/20  PT Start Time: 0725     PT Stop Time: 0750    PT Total Time (min): 25 min       Billable Minutes:  Gait Training 15 and Therapeutic Exercise 10    Treatment Type: Treatment  PT/PTA: PT     PTA Visit Number: 0       General Precautions: Standard, fall  Orthopedic Precautions: N/A   Braces: N/A         Subjective:  Communicated with NURSE SCOTT AND Epic CHART REVIEW  prior to session.   PT AGREED TO TX     Pain/Comfort  Pain Rating 1: 3/10  Location 1: abdomen  Pain Rating Post-Intervention 1: 3/10    Objective:   Patient found with: peripheral IV, oxygen    Functional Mobility:  PT MET IN RM SUP IN BED. PT AGREED TO TX. PT LOG ROLLED TO LEFT AND SEATED EOB WITH MIN A. PT SCOOTED TO EOB AND STOOD WITH RW AND MIN A FOR GT WITH SLOW STEP TO GT X 80'. PT RETURNED TO RM T/F TO CHAIR WITH RW AND MIN A. PT SEATED FOR B LE TE X 10 REPS OF AP, TKE, AND MIP. PT LEFT SEATED IN CHAIR WITH ALL NEEDS MET AND CALL BELL IN REACH.     AM-PAC 6 CLICK MOBILITY  How much help from another person does this patient currently need?   1 = Unable, Total/Dependent Assistance  2 = A lot, Maximum/Moderate Assistance  3 = A little, Minimum/Contact Guard/Supervision  4 = None, Modified [place]/Independent    Turning over in bed (including adjusting bedclothes, sheets and blankets)?: 3  Sitting down on and standing up from a chair with arms (e.g., wheelchair, bedside commode, etc.): 3  Moving from lying on back to sitting on the side of the bed?: 3  Moving to and from a bed to a chair (including a wheelchair)?: 3  Need to walk in hospital room?: 3  Climbing 3-5 steps with a railing?: 1  Basic Mobility Total Score: 16    AM-PAC Raw Score CMS G-Code Modifier Level of Impairment Assistance   6 % Total / Unable   7 - 9 CM 80 - 100% Maximal Assist   10 - 14 CL 60 - 80% Moderate Assist   15 - 19 CK 40  - 60% Moderate Assist   20 - 22 CJ 20 - 40% Minimal Assist   23 CI 1-20% SBA / CGA   24 CH 0% Independent/ Mod I     Patient left up in chair with call button in reach and chair alarm on.    Assessment:  PT TATI TX WITH INC SOB. PT FATIGUES QUICKLY.     Rehab identified problem list/impairments: Rehab identified problem list/impairments: weakness, impaired endurance, gait instability, impaired balance, pain, decreased lower extremity function, decreased upper extremity function, impaired functional mobilty, impaired cardiopulmonary response to activity    Rehab potential is good.    Activity tolerance: Fair    Discharge recommendations: Discharge Facility/Level of Care Needs: home health PT     Barriers to discharge:      Equipment recommendations: Equipment Needed After Discharge: walker, rolling     GOALS:   Multidisciplinary Problems     Physical Therapy Goals        Problem: Physical Therapy Goal    Goal Priority Disciplines Outcome Goal Variances Interventions   Physical Therapy Goal     PT, PT/OT Ongoing, Progressing     Description: 1. Patient will perform supine to/from sit mod indep  2. Patient will perform sit to/from stand with least AD mod indep  3. Patient will ambulate 500ft least AD mod indep                   PLAN:    Patient to be seen 5 x/week  to address the above listed problems via gait training, therapeutic activities, therapeutic exercises  Plan of Care expires: 08/15/20  Plan of Care reviewed with: patient         Saranya Herndon, PT  08/11/2020

## 2020-08-11 NOTE — SUBJECTIVE & OBJECTIVE
Interval History:  Did not sleep well last night will add melatonin.  Advanced to full liquids.  Dulcolax orally    Medications:  Continuous Infusions:   dextrose 5 % and 0.45 % NaCl with KCl 20 mEq 80 mL/hr at 08/11/20 0441     Scheduled Meds:   bisacodyL  10 mg Oral Once    chlorhexidine  10 mL Mouth/Throat BID    enoxaparin  40 mg Subcutaneous Q24H    gabapentin  300 mg Oral BID    nozaseptin   Each Nostril BID    pantoprazole  40 mg Oral Daily    paroxetine  40 mg Oral QAM    tiZANidine  4 mg Oral Q8H     PRN Meds:sodium chloride, diphenhydrAMINE, HYDROcodone-acetaminophen, HYDROcodone-acetaminophen, HYDROmorphone, melatonin, metoclopramide HCl, ondansetron, sodium chloride 0.9%     Review of patient's allergies indicates:  No Known Allergies  Objective:     Vital Signs (Most Recent):  Temp: 98.3 °F (36.8 °C) (08/11/20 0714)  Pulse: 61 (08/11/20 0714)  Resp: 20 (08/11/20 0714)  BP: 134/65 (08/11/20 0714)  SpO2: (!) 92 % (08/11/20 0714) Vital Signs (24h Range):  Temp:  [97.5 °F (36.4 °C)-99.7 °F (37.6 °C)] 98.3 °F (36.8 °C)  Pulse:  [46-62] 61  Resp:  [16-20] 20  SpO2:  [90 %-99 %] 92 %  BP: (108-136)/(55-65) 134/65     Weight: 56.4 kg (124 lb 5.4 oz)  Body mass index is 18.91 kg/m².    Intake/Output - Last 3 Shifts       08/09 0700 - 08/10 0659 08/10 0700 - 08/11 0659 08/11 0700 - 08/12 0659    I.V. (mL/kg) 1325 (23.5) 1904.7 (33.8)     Blood       Total Intake(mL/kg) 1325 (23.5) 1904.7 (33.8)     Urine (mL/kg/hr) 1650 (1.2) 1725 (1.3)     Stool 150 145     Total Output 1800 1870     Net -475 +34.7            Urine Occurrence  1 x           Physical Exam  Vitals signs and nursing note reviewed.   Constitutional:       Appearance: Normal appearance.   HENT:      Head: Normocephalic and atraumatic.   Cardiovascular:      Rate and Rhythm: Regular rhythm. Bradycardia present.      Pulses: Normal pulses.      Heart sounds: Normal heart sounds.      Comments: Bradycardic  Pulmonary:      Effort: Pulmonary  effort is normal.      Breath sounds: Normal breath sounds.   Abdominal:      General: Abdomen is flat. Bowel sounds are normal.      Tenderness: There is no abdominal tenderness (Incisional).      Comments: Incision is clean.  Left upper quadrant colostomy is slightly edematous.  There is stool output   Musculoskeletal:      Right lower leg: No edema.      Left lower leg: No edema.   Skin:     General: Skin is warm and dry.      Capillary Refill: Capillary refill takes less than 2 seconds.   Neurological:      General: No focal deficit present.      Mental Status: He is alert.   Psychiatric:         Mood and Affect: Mood normal.         Behavior: Behavior normal.         Judgment: Judgment normal.         Significant Labs:  Pending    Significant Diagnostics:  No new

## 2020-08-11 NOTE — ASSESSMENT & PLAN NOTE
Colonic distension and the peritoneal volvulus is likely related to the area diverticulitis with stricture

## 2020-08-11 NOTE — PLAN OF CARE
IV fluids administered as ordered. ABD incision, open to air CDI, stapes intact. Colostomy, CDI, dark green liquid noted in bag. Afebrile. Cardiac monitor bradycardic. No acute adverse events noted. Pt remained free from falls this shift. Will continue to monitor. Chart reviewed.

## 2020-08-11 NOTE — PLAN OF CARE
Continuous IV fluids infusing at 80ml/hr until pt has good appetite. Pt appetite remains poor at this time. Encouraged small frequent bites, both pt and wife verbalized understanding. Pt drowsy but remains oriented and easily arousable to voice. MD reviewed plan of care with both pt daughter and spouse. Pt resp even and unlabored. Instructed pt and wife on IS q 2 hours, verbalized understanding. Prn pain medication administered as need. Surgical incision to abdomen CORI and remains clean, dry, and intact. Pt bed in lowest locked position, call light within reach. Wife currently at bedside. In acute distress at this time. Will continue to monitor.

## 2020-08-11 NOTE — PROGRESS NOTES
[facility] -   General Surgery  Progress Note    Subjective:     History of Present Illness:  The patient is a 74-year-old male who has undergone colonoscopies were is found to have some diverticular changes.  Earlier today he underwent a Gastrografin or barium enema at the [facility] imaging center.  He had crampy abdominal pain and distension he presented to the emergency room.    The patient says that he is persistently bradycardic.  He denies any chest pain or shortness of breath.  He does have some left-sided abdominal pain    He was seen at a outpatient clinic abdominal film done there showed non specific bowel gas past    Post-Op Info:  Procedure(s) (LRB):  EARLINE PROCEDURE (N/A)  COLECTOMY, SIGMOID (N/A)   4 Days Post-Op     Interval History:  Did not sleep well last night will add melatonin.  Advanced to full liquids.  Dulcolax orally    Medications:  Continuous Infusions:   dextrose 5 % and 0.45 % NaCl with KCl 20 mEq 80 mL/hr at 08/11/20 0441     Scheduled Meds:   bisacodyL  10 mg Oral Once    chlorhexidine  10 mL Mouth/Throat BID    enoxaparin  40 mg Subcutaneous Q24H    gabapentin  300 mg Oral BID    nozaseptin   Each Nostril BID    pantoprazole  40 mg Oral Daily    paroxetine  40 mg Oral QAM    tiZANidine  4 mg Oral Q8H     PRN Meds:sodium chloride, diphenhydrAMINE, HYDROcodone-acetaminophen, HYDROcodone-acetaminophen, HYDROmorphone, melatonin, metoclopramide HCl, ondansetron, sodium chloride 0.9%     Review of patient's allergies indicates:  No Known Allergies  Objective:     Vital Signs (Most Recent):  Temp: 98.3 °F (36.8 °C) (08/11/20 0714)  Pulse: 61 (08/11/20 0714)  Resp: 20 (08/11/20 0714)  BP: 134/65 (08/11/20 0714)  SpO2: (!) 92 % (08/11/20 0714) Vital Signs (24h Range):  Temp:  [97.5 °F (36.4 °C)-99.7 °F (37.6 °C)] 98.3 °F (36.8 °C)  Pulse:  [46-62] 61  Resp:  [16-20] 20  SpO2:  [90 %-99 %] 92 %  BP: (108-136)/(55-65) 134/65     Weight: 56.4 kg (124 lb  5.4 oz)  Body mass index is 18.91 kg/m².    Intake/Output - Last 3 Shifts       08/09 0700 - 08/10 0659 08/10 0700 - 08/11 0659 08/11 0700 - 08/12 0659    I.V. (mL/kg) 1325 (23.5) 1904.7 (33.8)     Blood       Total Intake(mL/kg) 1325 (23.5) 1904.7 (33.8)     Urine (mL/kg/hr) 1650 (1.2) 1725 (1.3)     Stool 150 145     Total Output 1800 1870     Net -475 +34.7            Urine Occurrence  1 x           Physical Exam  Vitals signs and nursing note reviewed.   Constitutional:       Appearance: Normal appearance.   HENT:      Head: Normocephalic and atraumatic.   Cardiovascular:      Rate and Rhythm: Regular rhythm. Bradycardia present.      Pulses: Normal pulses.      Heart sounds: Normal heart sounds.      Comments: Bradycardic  Pulmonary:      Effort: Pulmonary effort is normal.      Breath sounds: Normal breath sounds.   Abdominal:      General: Abdomen is flat. Bowel sounds are normal.      Tenderness: There is no abdominal tenderness (Incisional).      Comments: Incision is clean.  Left upper quadrant colostomy is slightly edematous.  There is stool output   Musculoskeletal:      Right lower leg: No edema.      Left lower leg: No edema.   Skin:     General: Skin is warm and dry.      Capillary Refill: Capillary refill takes less than 2 seconds.   Neurological:      General: No focal deficit present.      Mental Status: He is alert.   Psychiatric:         Mood and Affect: Mood normal.         Behavior: Behavior normal.         Judgment: Judgment normal.         Significant Labs:  Pending    Significant Diagnostics:  No new    Assessment/Plan:     * Volvulus of sigmoid colon  Colonic distension and the peritoneal volvulus is likely related to the area diverticulitis with stricture    Hypophosphatemia  Replace fossa    Diverticular stricture  Postop day for from a sigmoid resection and colostomy.      Monitor blood pressure  Full liquid diet.  Check labs        Sincere Will MD  General Surgery  [facility]

## 2020-08-11 NOTE — ASSESSMENT & PLAN NOTE
Postop day for from a sigmoid resection and colostomy.      Monitor blood pressure  Full liquid diet.  Check labs

## 2020-08-12 LAB
FINAL PATHOLOGIC DIAGNOSIS: NORMAL
GROSS: NORMAL
PHOSPHATE SERPL-MCNC: 3 MG/DL (ref 2.7–4.5)

## 2020-08-12 PROCEDURE — 25000003 PHARM REV CODE 250: Performed by: SURGERY

## 2020-08-12 PROCEDURE — 97530 THERAPEUTIC ACTIVITIES: CPT

## 2020-08-12 PROCEDURE — 36415 COLL VENOUS BLD VENIPUNCTURE: CPT

## 2020-08-12 PROCEDURE — 97116 GAIT TRAINING THERAPY: CPT

## 2020-08-12 PROCEDURE — 27000221 HC OXYGEN, UP TO 24 HOURS

## 2020-08-12 PROCEDURE — 21400001 HC TELEMETRY ROOM

## 2020-08-12 PROCEDURE — 84100 ASSAY OF PHOSPHORUS: CPT

## 2020-08-12 PROCEDURE — 97110 THERAPEUTIC EXERCISES: CPT

## 2020-08-12 PROCEDURE — 63600175 PHARM REV CODE 636 W HCPCS: Performed by: SURGERY

## 2020-08-12 PROCEDURE — 99900035 HC TECH TIME PER 15 MIN (STAT)

## 2020-08-12 PROCEDURE — 94761 N-INVAS EAR/PLS OXIMETRY MLT: CPT

## 2020-08-12 RX ORDER — SIMETHICONE 80 MG
1 TABLET,CHEWABLE ORAL 3 TIMES DAILY PRN
Status: DISCONTINUED | OUTPATIENT
Start: 2020-08-12 | End: 2020-08-13 | Stop reason: HOSPADM

## 2020-08-12 RX ORDER — CLONIDINE HYDROCHLORIDE 0.1 MG/1
0.1 TABLET ORAL 2 TIMES DAILY
Status: DISCONTINUED | OUTPATIENT
Start: 2020-08-12 | End: 2020-08-13 | Stop reason: HOSPADM

## 2020-08-12 RX ADMIN — GABAPENTIN 300 MG: 300 CAPSULE ORAL at 09:08

## 2020-08-12 RX ADMIN — SIMETHICONE CHEW TAB 80 MG 80 MG: 80 TABLET ORAL at 07:08

## 2020-08-12 RX ADMIN — PAROXETINE HYDROCHLORIDE HEMIHYDRATE 40 MG: 20 TABLET, FILM COATED ORAL at 06:08

## 2020-08-12 RX ADMIN — CHLORHEXIDINE GLUCONATE 0.12% ORAL RINSE 10 ML: 1.2 LIQUID ORAL at 09:08

## 2020-08-12 RX ADMIN — TIZANIDINE 4 MG: 4 TABLET ORAL at 09:08

## 2020-08-12 RX ADMIN — POTASSIUM CHLORIDE, DEXTROSE MONOHYDRATE AND SODIUM CHLORIDE: 150; 5; 450 INJECTION, SOLUTION INTRAVENOUS at 05:08

## 2020-08-12 RX ADMIN — PANTOPRAZOLE SODIUM 40 MG: 40 TABLET, DELAYED RELEASE ORAL at 09:08

## 2020-08-12 RX ADMIN — POTASSIUM & SODIUM PHOSPHATES POWDER PACK 280-160-250 MG 1 PACKET: 280-160-250 PACK at 09:08

## 2020-08-12 RX ADMIN — CLONIDINE HYDROCHLORIDE 0.1 MG: 0.1 TABLET ORAL at 09:08

## 2020-08-12 RX ADMIN — TIZANIDINE 4 MG: 4 TABLET ORAL at 06:08

## 2020-08-12 RX ADMIN — ENOXAPARIN SODIUM 40 MG: 30 INJECTION SUBCUTANEOUS at 05:08

## 2020-08-12 RX ADMIN — TIZANIDINE 4 MG: 4 TABLET ORAL at 01:08

## 2020-08-12 RX ADMIN — HYDROCODONE BITARTRATE AND ACETAMINOPHEN 1 TABLET: 7.5; 325 TABLET ORAL at 09:08

## 2020-08-12 RX ADMIN — HYDROMORPHONE HYDROCHLORIDE 1 MG: 1 INJECTION, SOLUTION INTRAMUSCULAR; INTRAVENOUS; SUBCUTANEOUS at 03:08

## 2020-08-12 NOTE — PLAN OF CARE
IV fluids administered as ordered. PRN medication administered for pain. Tele monitor noted. ABD incision, open to air CDI, stapes intact. Colostomy, CDI, dark green liquid noted in bag. Cardiac monitor Bradycardic - NSR. VSS. Afebrile. No acute adverse events noted. Pt remained free from falls this shift. Will continue to monitor. Chart reviewed.

## 2020-08-12 NOTE — PROGRESS NOTES
[facility] -   General Surgery  Progress Note    Subjective:     History of Present Illness:  The patient is a 74-year-old male who has undergone colonoscopies were is found to have some diverticular changes.  Earlier today he underwent a Gastrografin or barium enema at the [facility] imaging center.  He had crampy abdominal pain and distension he presented to the emergency room.    The patient says that he is persistently bradycardic.  He denies any chest pain or shortness of breath.  He does have some left-sided abdominal pain    He was seen at a outpatient clinic abdominal film done there showed non specific bowel gas past    Post-Op Info:  Procedure(s) (LRB):  EARLINE PROCEDURE (N/A)  COLECTOMY, SIGMOID (N/A)   5 Days Post-Op     Interval History:  Patient is tolerated full liquids.  His pain is better controlled.  Will advance to a regular diet today    Medications:  Continuous Infusions:   dextrose 5 % and 0.45 % NaCl with KCl 20 mEq 50 mL/hr at 08/12/20 1032     Scheduled Meds:   chlorhexidine  10 mL Mouth/Throat BID    cloNIDine  0.1 mg Oral BID    enoxaparin  40 mg Subcutaneous Q24H    gabapentin  300 mg Oral BID    nozaseptin   Each Nostril BID    pantoprazole  40 mg Oral Daily    paroxetine  40 mg Oral QAM    potassium, sodium phosphates  1 packet Oral BID    tiZANidine  4 mg Oral Q8H     PRN Meds:sodium chloride, diphenhydrAMINE, HYDROcodone-acetaminophen, HYDROcodone-acetaminophen, HYDROmorphone, melatonin, metoclopramide HCl, ondansetron, simethicone, sodium chloride 0.9%     Review of patient's allergies indicates:  No Known Allergies  Objective:     Vital Signs (Most Recent):  Temp: 98.1 °F (36.7 °C) (08/12/20 1218)  Pulse: (!) 58 (08/12/20 1218)  Resp: 18 (08/12/20 1218)  BP: (!) 171/74 (08/12/20 1218)  SpO2: 95 % (08/12/20 1218) Vital Signs (24h Range):  Temp:  [97.9 °F (36.6 °C)-99.8 °F (37.7 °C)] 98.1 °F (36.7 °C)  Pulse:  [50-71] 58  Resp:  [17-20]  18  SpO2:  [90 %-95 %] 95 %  BP: (111-172)/(59-77) 171/74     Weight: 56.4 kg (124 lb 5.4 oz)  Body mass index is 18.91 kg/m².    Intake/Output - Last 3 Shifts       08/10 0700 - 08/11 0659 08/11 0700 - 08/12 0659 08/12 0700 - 08/13 0659    I.V. (mL/kg) 1904.7 (33.8) 2000 (35.5)     Total Intake(mL/kg) 1904.7 (33.8) 2000 (35.5)     Urine (mL/kg/hr) 1725 (1.3) 1575 (1.2) 400 (1)    Stool 145 175     Total Output 1870 1750 400    Net +34.7 +250 -400           Urine Occurrence 1 x            Physical Exam  Nursing note reviewed.   HENT:      Head: Normocephalic and atraumatic.   Cardiovascular:      Rate and Rhythm: Normal rate and regular rhythm.   Pulmonary:      Effort: Pulmonary effort is normal.   Abdominal:      General: Abdomen is flat. Bowel sounds are normal.      Palpations: Abdomen is soft.      Tenderness: There is abdominal tenderness (Mild).      Comments: Incision is clean.  Colostomy is edematous and functional   Skin:     Capillary Refill: Capillary refill takes less than 2 seconds.   Neurological:      General: No focal deficit present.      Mental Status: He is alert and oriented to person, place, and time.   Psychiatric:         Mood and Affect: Mood normal.         Behavior: Behavior normal.         Thought Content: Thought content normal.         Judgment: Judgment normal.         Significant Labs:  CBC:   Recent Labs   Lab 08/11/20  0738   WBC 9.24   RBC 3.41*   HGB 10.0*   HCT 32.7*      MCV 96   MCH 29.3   MCHC 30.6*     BMP:   Recent Labs   Lab 08/11/20  0738   GLU 91   *   K 4.0      CO2 19*   BUN 10   CREATININE 0.8   CALCIUM 8.9   MG 1.7    Phosphorus was reviewed  CMP:   Recent Labs   Lab 08/07/20  1443  08/11/20  0738   GLU 99   < > 91   CALCIUM 9.3   < > 8.9   ALBUMIN 4.0  --   --    PROT 7.3  --   --       < > 135*   K 3.5   < > 4.0   CO2 29   < > 19*      < > 105   BUN 9   < > 10   CREATININE 0.9   < > 0.8   ALKPHOS 44*  --   --    ALT 13  --   --    AST  27  --   --    BILITOT 0.4  --   --     < > = values in this interval not displayed.       Significant Diagnostics:  No new    Assessment/Plan:     * Volvulus of sigmoid colon  Colonic distension and the peritoneal volvulus is likely related to the area diverticulitis with stricture    Hypophosphatemia  Resolved    Essential hypertension  Beta blockers being held by Cardiology.  Will add clonidine    Diverticular stricture  Postop day for for from a sigmoid resection and colostomy.      Add clonidine  Regular diet  Hep-Lock IV fluid  Possible home tomorrow with home health        Sincere Will MD  General Surgery  [facility]

## 2020-08-12 NOTE — PROGRESS NOTES
Home Oxygen Evaluation    Date Performed: 2020    1) Patient's Home O2 Sat on room air, while at rest: 93%        If O2 sats on room air at rest are 88% or below, patient qualifies. No additional testing needed. Document N/A in steps 2 and 3. If 89% or above, complete steps 2.      2) Patient's O2 Sat on room air while exercisin%        If O2 sats on room air while exercising remain 89% or above patient does not qualify, no further testing needed Document N/A in step 3. If O2 sats on room air while exercising are 88% or below, continue to step 3.      3) Patient's O2 Sat while exercising on O2: NA at NA LPM         (Must show improvement from #2 for patients to qualify)    If O2 sats improve on oxygen, patient qualifies for portable oxygen. If not, the patient does not qualify.

## 2020-08-12 NOTE — PLAN OF CARE
Patient received laying in bed awake, x3, in no acute distress. Vitals present within stable limits. Medications tolerated well. Midline surgical incision remains clean and intact; no abnormalities assessed at this time. Safety precautions maintained. Will continue to monitor progress.

## 2020-08-12 NOTE — ASSESSMENT & PLAN NOTE
Postop day for for from a sigmoid resection and colostomy.      Add clonidine  Regular diet  Hep-Lock IV fluid  Possible home tomorrow with home health

## 2020-08-12 NOTE — SUBJECTIVE & OBJECTIVE
Interval History:  Patient is tolerated full liquids.  His pain is better controlled.  Will advance to a regular diet today    Medications:  Continuous Infusions:   dextrose 5 % and 0.45 % NaCl with KCl 20 mEq 50 mL/hr at 08/12/20 1032     Scheduled Meds:   chlorhexidine  10 mL Mouth/Throat BID    cloNIDine  0.1 mg Oral BID    enoxaparin  40 mg Subcutaneous Q24H    gabapentin  300 mg Oral BID    nozaseptin   Each Nostril BID    pantoprazole  40 mg Oral Daily    paroxetine  40 mg Oral QAM    potassium, sodium phosphates  1 packet Oral BID    tiZANidine  4 mg Oral Q8H     PRN Meds:sodium chloride, diphenhydrAMINE, HYDROcodone-acetaminophen, HYDROcodone-acetaminophen, HYDROmorphone, melatonin, metoclopramide HCl, ondansetron, simethicone, sodium chloride 0.9%     Review of patient's allergies indicates:  No Known Allergies  Objective:     Vital Signs (Most Recent):  Temp: 98.1 °F (36.7 °C) (08/12/20 1218)  Pulse: (!) 58 (08/12/20 1218)  Resp: 18 (08/12/20 1218)  BP: (!) 171/74 (08/12/20 1218)  SpO2: 95 % (08/12/20 1218) Vital Signs (24h Range):  Temp:  [97.9 °F (36.6 °C)-99.8 °F (37.7 °C)] 98.1 °F (36.7 °C)  Pulse:  [50-71] 58  Resp:  [17-20] 18  SpO2:  [90 %-95 %] 95 %  BP: (111-172)/(59-77) 171/74     Weight: 56.4 kg (124 lb 5.4 oz)  Body mass index is 18.91 kg/m².    Intake/Output - Last 3 Shifts       08/10 0700 - 08/11 0659 08/11 0700 - 08/12 0659 08/12 0700 - 08/13 0659    I.V. (mL/kg) 1904.7 (33.8) 2000 (35.5)     Total Intake(mL/kg) 1904.7 (33.8) 2000 (35.5)     Urine (mL/kg/hr) 1725 (1.3) 1575 (1.2) 400 (1)    Stool 145 175     Total Output 1870 1750 400    Net +34.7 +250 -400           Urine Occurrence 1 x            Physical Exam  Nursing note reviewed.   HENT:      Head: Normocephalic and atraumatic.   Cardiovascular:      Rate and Rhythm: Normal rate and regular rhythm.   Pulmonary:      Effort: Pulmonary effort is normal.   Abdominal:      General: Abdomen is flat. Bowel sounds are normal.       Palpations: Abdomen is soft.      Tenderness: There is abdominal tenderness (Mild).      Comments: Incision is clean.  Colostomy is edematous and functional   Skin:     Capillary Refill: Capillary refill takes less than 2 seconds.   Neurological:      General: No focal deficit present.      Mental Status: He is alert and oriented to person, place, and time.   Psychiatric:         Mood and Affect: Mood normal.         Behavior: Behavior normal.         Thought Content: Thought content normal.         Judgment: Judgment normal.         Significant Labs:  CBC:   Recent Labs   Lab 08/11/20  0738   WBC 9.24   RBC 3.41*   HGB 10.0*   HCT 32.7*      MCV 96   MCH 29.3   MCHC 30.6*     BMP:   Recent Labs   Lab 08/11/20  0738   GLU 91   *   K 4.0      CO2 19*   BUN 10   CREATININE 0.8   CALCIUM 8.9   MG 1.7    Phosphorus was reviewed  CMP:   Recent Labs   Lab 08/07/20  1443  08/11/20  0738   GLU 99   < > 91   CALCIUM 9.3   < > 8.9   ALBUMIN 4.0  --   --    PROT 7.3  --   --       < > 135*   K 3.5   < > 4.0   CO2 29   < > 19*      < > 105   BUN 9   < > 10   CREATININE 0.9   < > 0.8   ALKPHOS 44*  --   --    ALT 13  --   --    AST 27  --   --    BILITOT 0.4  --   --     < > = values in this interval not displayed.       Significant Diagnostics:  No new

## 2020-08-13 VITALS
SYSTOLIC BLOOD PRESSURE: 150 MMHG | WEIGHT: 124.31 LBS | BODY MASS INDEX: 18.84 KG/M2 | RESPIRATION RATE: 18 BRPM | OXYGEN SATURATION: 93 % | TEMPERATURE: 98 F | DIASTOLIC BLOOD PRESSURE: 72 MMHG | HEIGHT: 68 IN | HEART RATE: 55 BPM

## 2020-08-13 LAB — PHOSPHATE SERPL-MCNC: 3.8 MG/DL (ref 2.7–4.5)

## 2020-08-13 PROCEDURE — 36415 COLL VENOUS BLD VENIPUNCTURE: CPT

## 2020-08-13 PROCEDURE — 25000003 PHARM REV CODE 250: Performed by: SURGERY

## 2020-08-13 PROCEDURE — 84100 ASSAY OF PHOSPHORUS: CPT

## 2020-08-13 PROCEDURE — 99900035 HC TECH TIME PER 15 MIN (STAT)

## 2020-08-13 PROCEDURE — 94761 N-INVAS EAR/PLS OXIMETRY MLT: CPT

## 2020-08-13 PROCEDURE — 27000221 HC OXYGEN, UP TO 24 HOURS

## 2020-08-13 RX ORDER — CLONIDINE HYDROCHLORIDE 0.1 MG/1
0.1 TABLET ORAL 2 TIMES DAILY
Qty: 30 TABLET | Refills: 0 | Status: SHIPPED | OUTPATIENT
Start: 2020-08-13 | End: 2021-08-13

## 2020-08-13 RX ADMIN — PAROXETINE HYDROCHLORIDE HEMIHYDRATE 40 MG: 20 TABLET, FILM COATED ORAL at 06:08

## 2020-08-13 RX ADMIN — POTASSIUM & SODIUM PHOSPHATES POWDER PACK 280-160-250 MG 1 PACKET: 280-160-250 PACK at 09:08

## 2020-08-13 RX ADMIN — TIZANIDINE 4 MG: 4 TABLET ORAL at 06:08

## 2020-08-13 RX ADMIN — CLONIDINE HYDROCHLORIDE 0.1 MG: 0.1 TABLET ORAL at 09:08

## 2020-08-13 RX ADMIN — PANTOPRAZOLE SODIUM 40 MG: 40 TABLET, DELAYED RELEASE ORAL at 09:08

## 2020-08-13 RX ADMIN — GABAPENTIN 300 MG: 300 CAPSULE ORAL at 09:08

## 2020-08-13 RX ADMIN — HYDROCODONE BITARTRATE AND ACETAMINOPHEN 1 TABLET: 7.5; 325 TABLET ORAL at 06:08

## 2020-08-13 NOTE — PROGRESS NOTES
Phone call received from pt's daughter in regards to pt's discharge. She stated that she was informed that Dr Will was not discharging the pt d/t ostomy education for the pt and wife to be perfromed again today. She stated that she was a nurse and that she is capable of caring for the ostomy and wanted the pt discharged today.  I informed her that Dr Will has informed me this am that he wanted the pt and his wife to receive more teaching to ensure if the pt's daughter was not at home and there is an issue with the ostomy that they are able to take care of it. She stated that the pt and his wife live with her and she is always home and will take care of it. I asked if she ever left them at home alone and she stated only for a minute to go to [facility].  She then stated that if she needs to she will take time off from work to care for him. I then asked about her leaving for work and if someone would be with them and she stated that she has several friends that are nurses that can assist if needed.  She stated that ostomy teaching did not need to be done this am and wants the pt discharged. I informed her that I would send a message to Dr Will regarding the above and have Manfred contact her once the discharge is in. Daughter's name is Maile Vásquez

## 2020-08-13 NOTE — PLAN OF CARE
Bed Mobility sba; Transfers sba; Amb > 100ft cga with patient pushing IV pole; Rec HHPT but patient does NOT want a RW

## 2020-08-13 NOTE — PROGRESS NOTES
The patient started call the hospital and was fairly insistent that he be sent home today.  She stated that she is at nursing could provide colostomy care.    Please see progress note      The patient underwent additional colostomy teaching.  Please see progress notes.    He will be discharged today

## 2020-08-13 NOTE — PROGRESS NOTES
[facility] -   General Surgery  Progress Note    Subjective:     History of Present Illness:  The patient is a 74-year-old male who has undergone colonoscopies were is found to have some diverticular changes.  Earlier today he underwent a Gastrografin or barium enema at the [facility] imaging center.  He had crampy abdominal pain and distension he presented to the emergency room.    The patient says that he is persistently bradycardic.  He denies any chest pain or shortness of breath.  He does have some left-sided abdominal pain    He was seen at a outpatient clinic abdominal film done there showed non specific bowel gas past    Post-Op Info:  Procedure(s) (LRB):  EARLINE PROCEDURE (N/A)  COLECTOMY, SIGMOID (N/A)   6 Days Post-Op     Interval History:  Patient complained of not sleeping last night he has left-sided abdominal pain which he says improved.  He has tolerated a regular diet.    He  nor his family have learn to change the colostomy bag according to the wound care nurse    Medications:  Continuous Infusions:   dextrose 5 % and 0.45 % NaCl with KCl 20 mEq 50 mL/hr at 08/12/20 1704     Scheduled Meds:   cloNIDine  0.1 mg Oral BID    enoxaparin  40 mg Subcutaneous Q24H    gabapentin  300 mg Oral BID    nozaseptin   Each Nostril BID    pantoprazole  40 mg Oral Daily    paroxetine  40 mg Oral QAM    potassium, sodium phosphates  1 packet Oral BID    tiZANidine  4 mg Oral Q8H     PRN Meds:sodium chloride, diphenhydrAMINE, HYDROcodone-acetaminophen, HYDROcodone-acetaminophen, HYDROmorphone, melatonin, metoclopramide HCl, ondansetron, simethicone, sodium chloride 0.9%     Review of patient's allergies indicates:  No Known Allergies  Objective:     Vital Signs (Most Recent):  Temp: 98.4 °F (36.9 °C) (08/13/20 0704)  Pulse: (!) 52 (08/13/20 0704)  Resp: 18 (08/13/20 0704)  BP: (!) 150/72 (08/13/20 0704)  SpO2: (!) 93 % (08/13/20 0704) Vital Signs (24h Range):  Temp:  [98 °F  (36.7 °C)-98.7 °F (37.1 °C)] 98.4 °F (36.9 °C)  Pulse:  [51-58] 52  Resp:  [18] 18  SpO2:  [91 %-95 %] 93 %  BP: (150-171)/(72-77) 150/72     Weight: 56.4 kg (124 lb 5.4 oz)  Body mass index is 18.91 kg/m².    Intake/Output - Last 3 Shifts       08/11 0700 - 08/12 0659 08/12 0700 - 08/13 0659 08/13 0700 - 08/14 0659    P.O.  180     I.V. (mL/kg) 2000 (35.5)      Total Intake(mL/kg) 2000 (35.5) 180 (3.2)     Urine (mL/kg/hr) 1575 (1.2) 700 (0.5)     Stool 175 0     Total Output 1750 700     Net +250 -520                  Physical Exam  Vitals signs and nursing note reviewed.   Constitutional:       Comments: Thin   Neck:      Musculoskeletal: Normal range of motion and neck supple.   Cardiovascular:      Rate and Rhythm: Regular rhythm. Bradycardia present.      Pulses: Normal pulses.      Heart sounds: Normal heart sounds.   Pulmonary:      Effort: Pulmonary effort is normal.      Breath sounds: Normal breath sounds.   Abdominal:      General: Abdomen is flat. Bowel sounds are normal. There is no distension.      Palpations: Abdomen is soft.      Tenderness: Tenderness: Mild tenderness.      Comments: Incision is clean.  Colostomy is pink and somewhat edematous.  There is stool output   Skin:     Capillary Refill: Capillary refill takes less than 2 seconds.   Neurological:      General: No focal deficit present.      Mental Status: He is alert.   Psychiatric:         Mood and Affect: Mood normal.         Behavior: Behavior normal.         Thought Content: Thought content normal.         Judgment: Judgment normal.         Significant Labs:  Phosphorus is pending    Significant Diagnostics:  None    Assessment/Plan:     * Volvulus of sigmoid colon  Colonic distension and the peritoneal volvulus is likely related to the area diverticulitis with stricture    Hypophosphatemia  Resolved    Essential hypertension  Beta blockers being held by Cardiology.  Will add clonidine    Diverticular stricture  Postop day 5 for from a  sigmoid resection and colostomy.      Add clonidine  Regular diet  Hep-Lock IV fluid  Patient is family need additional teaching to be able to change the colostomy bag in case home health is not readily available which is often the case.    Continue colostomy teaching.  Hopefully the patient will be ready for discharge by tomorrow        Sincere Will MD  General Surgery  [facility]

## 2020-08-13 NOTE — SUBJECTIVE & OBJECTIVE
Interval History:  Patient complained of not sleeping last night he has left-sided abdominal pain which he says improved.  He has tolerated a regular diet.    He  nor his family have learn to change the colostomy bag according to the wound care nurse    Medications:  Continuous Infusions:   dextrose 5 % and 0.45 % NaCl with KCl 20 mEq 50 mL/hr at 08/12/20 1704     Scheduled Meds:   cloNIDine  0.1 mg Oral BID    enoxaparin  40 mg Subcutaneous Q24H    gabapentin  300 mg Oral BID    nozaseptin   Each Nostril BID    pantoprazole  40 mg Oral Daily    paroxetine  40 mg Oral QAM    potassium, sodium phosphates  1 packet Oral BID    tiZANidine  4 mg Oral Q8H     PRN Meds:sodium chloride, diphenhydrAMINE, HYDROcodone-acetaminophen, HYDROcodone-acetaminophen, HYDROmorphone, melatonin, metoclopramide HCl, ondansetron, simethicone, sodium chloride 0.9%     Review of patient's allergies indicates:  No Known Allergies  Objective:     Vital Signs (Most Recent):  Temp: 98.4 °F (36.9 °C) (08/13/20 0704)  Pulse: (!) 52 (08/13/20 0704)  Resp: 18 (08/13/20 0704)  BP: (!) 150/72 (08/13/20 0704)  SpO2: (!) 93 % (08/13/20 0704) Vital Signs (24h Range):  Temp:  [98 °F (36.7 °C)-98.7 °F (37.1 °C)] 98.4 °F (36.9 °C)  Pulse:  [51-58] 52  Resp:  [18] 18  SpO2:  [91 %-95 %] 93 %  BP: (150-171)/(72-77) 150/72     Weight: 56.4 kg (124 lb 5.4 oz)  Body mass index is 18.91 kg/m².    Intake/Output - Last 3 Shifts       08/11 0700 - 08/12 0659 08/12 0700 - 08/13 0659 08/13 0700 - 08/14 0659    P.O.  180     I.V. (mL/kg) 2000 (35.5)      Total Intake(mL/kg) 2000 (35.5) 180 (3.2)     Urine (mL/kg/hr) 1575 (1.2) 700 (0.5)     Stool 175 0     Total Output 1750 700     Net +250 -520                  Physical Exam  Vitals signs and nursing note reviewed.   Constitutional:       Comments: Thin   Neck:      Musculoskeletal: Normal range of motion and neck supple.   Cardiovascular:      Rate and Rhythm: Regular rhythm. Bradycardia present.       Pulses: Normal pulses.      Heart sounds: Normal heart sounds.   Pulmonary:      Effort: Pulmonary effort is normal.      Breath sounds: Normal breath sounds.   Abdominal:      General: Abdomen is flat. Bowel sounds are normal. There is no distension.      Palpations: Abdomen is soft.      Tenderness: Tenderness: Mild tenderness.      Comments: Incision is clean.  Colostomy is pink and somewhat edematous.  There is stool output   Skin:     Capillary Refill: Capillary refill takes less than 2 seconds.   Neurological:      General: No focal deficit present.      Mental Status: He is alert.   Psychiatric:         Mood and Affect: Mood normal.         Behavior: Behavior normal.         Thought Content: Thought content normal.         Judgment: Judgment normal.         Significant Labs:  Phosphorus is pending    Significant Diagnostics:  None

## 2020-08-13 NOTE — PROGRESS NOTES
Dr Will responded to my message regarding the discharge. He asked that ostomy nurse teach the pt and wife again this am and to notify him once this was complete and he would put the discharge orders in at that time.

## 2020-08-13 NOTE — DISCHARGE SUMMARY
[facility]  General Surgery  Discharge Summary      Patient Name: Henrique Vargas Jr.  MRN: [id number]  Admission Date: 8/7/2020  Hospital Length of Stay: 6 days  Discharge Date and Time:  08/13/2020 11:08 AM  Attending Physician: Sincere Will MD   Discharging Provider: Sincere Will MD  Primary Care Provider: Bjorn Warner MD    HPI:   The patient is a 74-year-old male who has undergone colonoscopies were is found to have some diverticular changes.  Earlier today he underwent a Gastrografin or barium enema at the [facility] imaging center.  He had crampy abdominal pain and distension he presented to the emergency room.    The patient says that he is persistently bradycardic.  He denies any chest pain or shortness of breath.  He does have some left-sided abdominal pain    He was seen at a outpatient clinic abdominal film done there showed non specific bowel gas past    Procedure(s) (LRB):  EARLINE PROCEDURE (N/A)  COLECTOMY, SIGMOID (N/A)      Indwelling Lines/Drains at time of discharge:   Lines/Drains/Airways     Drain                 Colostomy 08/07/20 2252 LUQ 5 days              Hospital Course: A CT scan done in the emergency room showed a dilated colon with concerns for a sigmoid volvulus as well as some diverticular changes.  According to the emergency room doctor the GI physician had concerns with colonoscopy due to the diverticulitis seen on the CT scan.  A surgical consultation was obtained.    08/08/2020.  Postop day 1 from a sigmoid resection and end colostomy for diverticulitis causing partial obstruction.  Patient has incisional pain.    08/09/2020.  Postoperative day 2 sigmoid resection and colostomy.  Blood pressure improved.  Transferred 1 unit.  Pain is better controlled.  Little ostomy output    08/10/2020 postop day 2 sigmoid resection and colostomy.  Patient's blood pressure has improved.  He complains of abdominal pain.  He does have a morphine pain pump  in its back for chronic back disease.  There is some air in his colostomy bag.  Will start clear liquid    08/11/2020 postop day 3.  Shauan procedure.  Not sleeping well.  Urinating.  Ostomy output is beginning.  Tolerated clear liquids    08/12/2020.  Postop day 4.  Shauna procedure.  Slept better.  Urinating.  More stool out of the colostomy.  Tolerated full liquids.  Blood pressure is slightly    08/13/2020.  Postop day 5.  Shauna procedure.  Reported not sleeping well.  Able urinate.  Colostomy with stool output.  Patient and family have not yet learn to change the colostomy bag according to the wound care nurses evaluation.      The patient daughter requested that her father be sent home today.  She stated that she is a nurse and could take care of him at home.    He had additional colostomy teaching.  He was discharged    Consults:   Consults (From admission, onward)        Status Ordering Provider     Inpatient consult to Cardiology  Once     Provider:  Earl Encinas MD    Completed FIONA SERRANO     Inpatient consult to Social Work  Once     Provider:  (Not yet assigned)    FIONA Vega          Significant Diagnostic Studies: Labs: BMP: No results for input(s): GLU, NA, K, CL, CO2, BUN, CREATININE, CALCIUM, MG in the last 48 hours., CMP No results for input(s): NA, K, CL, CO2, GLU, BUN, CREATININE, CALCIUM, PROT, ALBUMIN, BILITOT, ALKPHOS, AST, ALT, ANIONGAP, ESTGFRAFRICA, EGFRNONAA in the last 48 hours. and CBC No results for input(s): WBC, HGB, HCT, PLT in the last 48 hours.  Radiology: CT scan: CT ABDOMEN PELVIS WITH CONTRAST:   Results for orders placed or performed during the hospital encounter of 08/07/20   CT Abdomen Pelvis With Contrast    Narrative    EXAMINATION:  CT ABDOMEN PELVIS WITH CONTRAST    CLINICAL HISTORY:  Abdominal pain, acute, nonlocalized;    TECHNIQUE:  Low dose axial images, sagittal and coronal reformations were obtained from the lung bases to the pubic  symphysis after  mL Omnipaque 350. Oral Omnipaque 350 also administered.    All CT scans at this location are performed using dose modulation techniques as appropriate to a performed exam including the following: Automated exposure control; adjustment of the mA and/or kV according to patient size.    COMPARISON:  10/09/2015 CT abdomen pelvis    FINDINGS:  Heart: Normal in size. No pericardial effusion.    Lung Bases: Well aerated, without consolidation or pleural fluid.    Liver: Normal in size and attenuation, with no focal hepatic lesions.    Gallbladder: No calcified gallstones.    Bile Ducts: No evidence of dilated ducts.    Pancreas: Mild pancreatic atrophy.    Spleen: Unremarkable.    Adrenals: Unremarkable.    Kidneys/ Ureters: Nonobstructing left upper pole 3 mm renal stone.  No hydronephrosis.    Bladder: No evidence of wall thickening.    Reproductive organs: Evidence of prior prostatectomy.  Surgical clips in the pelvis.  Small volume free fluid also seen in the pelvis.    GI Tract/Mesentery: Large hiatal hernia containing the body and fundus of the stomach.  Duodenal diverticulum again noted..  Small bowel unremarkable.  Normal transit of oral contrast.  Gaseous distention of the right colon, transverse colon, descending colon up to 6.8 cm.  There is partial twisting of the sigmoid mesentery at the left lower quadrant suggesting early or developing sigmoid volvulus.  There is also wall thickening and mild inflammation of the sigmoid colon suggesting mild to moderate diverticulitis, with mild luminal narrowing.  Whether the colonic distension is related to the partial twisting of the sigmoid mesentery or the sigmoid diverticulitis is uncertain.  No abscess or free air.  No pneumatosis.    Appendix: Appendix is not well visualized.  No evidence of appendicitis.    Peritoneal Space: No free air.    Retroperitoneum: No significant adenopathy.    Abdominal wall: Unremarkable.    Vasculature: Aortoiliac   atherosclerotic calcification. No aneurysm.    Bones: Extensive thoracolumbar fixation hardware.  Lumbar neurostimulator device.  Bones appear osteopenic.      Impression    There is partial twisting of the sigmoid mesentery at the left lower quadrant suggesting early or developing sigmoid volvulus. There is also wall thickening, luminal narrowing, and mild inflammation of the lower sigmoid colon distal to the twisting, suggesting mild to moderate diverticulitis (comparison with prior study of 10/09/2015 suggests this may be a chronic or recurrent diverticulitis).  Whether the gaseous distension of the right colon, transverse colon, and descending colon is related to the partial twisting of the sigmoid mesentery, or to the sigmoid diverticulitis, is uncertain.  No abscess or free air.  No pneumatosis.    This critical information above was relayed by myself by telephone to Dr. Macedo on 08/07/2020 at 16:52.      Electronically signed by: Onofre Leonard  Date:    08/07/2020  Time:    17:00    and CT ABDOMEN PELVIS WITHOUT CONTRAST: No results found for this visit on 08/07/20.    Pending Diagnostic Studies:     None        Final Active Diagnoses:    Diagnosis Date Noted POA    PRINCIPAL PROBLEM:  Volvulus of sigmoid colon [K56.2] 08/07/2020 Yes    Hypophosphatemia [E83.39] 08/10/2020 No    Essential hypertension [I10] 08/08/2020 Yes    Diverticular stricture [K56.699] 08/07/2020 Yes      Problems Resolved During this Admission:      Discharged Condition: stable    Disposition: Home-Health Care [facility]    Follow Up:  Follow-up Information     Sincere Will MD In 10 days.    Specialty: General Surgery  Why: post op appt  Contact information:  [address]  [place] [address]  [phone]             Bjorn Warner MD In 1 week.    Specialty: Family Medicine  Contact information:  [address] [facility] [address]  [phone]             [facility] CARDIOLOGY In 1  "week.    Specialty: Cardiology  Why: Beta blocker was stopped in the hospital, reassess need  Contact information:  [address]  [facility] [address] [phone]               Patient Instructions:      OSTOMY SUPPLIES FOR HOME USE     Order Specific Question Answer Comments   Height: 5' 8" (1.727 m)    Weight: 56.4 kg (124 lb 5.4 oz)    Length of need (1-99 months): 12    Diagnosis Colostomy    1 piece system:Qty - Month 15 Sensura essence     Reference Number [id number]    Paste? Yes    Skin prep? Yes [id number]   Ostomy belt? No    Odor eliminator? Yes [id number]   Powder? Yes [id number]   Adhesive remover? Yes [id number]   Tape? No    Other supplies: scissors [id number]    Other supplies: moldable ring [id number]    Does patient have medical equipment at home? none      Diet Adult Regular     Lifting restrictions   Order Comments: No lifting more than 30 pounds for 2 weeks     Notify your health care provider if you experience any of the following:  temperature >100.4     Notify your health care provider if you experience any of the following:  persistent nausea and vomiting or diarrhea     Notify your health care provider if you experience any of the following:  severe uncontrolled pain     Notify your health care provider if you experience any of the following:  redness, tenderness, or signs of infection (pain, swelling, redness, odor or green/yellow discharge around incision site)     No dressing needed     Change dressing (specify)   Order Comments: Change ostomy bag as needed     Medications:  Reconciled Home Medications:      Medication List      START taking these medications    cloNIDine 0.1 MG tablet  Commonly known as: CATAPRES  Take 1 tablet (0.1 mg total) by mouth 2 (two) times daily.     nozaseptin  nasal   Commonly known as: NOZIN  1 each by Each Nostril route 2 (two) times daily. for 5 days        CONTINUE taking these medications    acetaminophen 650 MG Tbsr  Commonly known as: " TYLENOL  Take 650 mg by mouth every 8 (eight) hours.     alendronate 70 MG tablet  Commonly known as: FOSAMAX        STOP taking these medications    atenoloL 50 MG tablet  Commonly known as: TENORMIN        ASK your doctor about these medications    gabapentin 300 MG capsule  Commonly known as: NEURONTIN     omeprazole 40 MG capsule  Commonly known as: PRILOSEC  Take 40 mg by mouth.     oxyCODONE 5 mg Cap  Commonly known as: OXY-IR  Take 5 mg by mouth every 4 (four) hours as needed.     oxyCODONE-acetaminophen  mg per tablet  Commonly known as: PERCOCET     paroxetine 40 MG tablet  Commonly known as: PAXIL  Take 40 mg by mouth every morning.     tiZANidine 4 MG tablet  Commonly known as: ZANAFLEX          Time spent on the discharge of patient: 1 minutes    Sincere Will MD  General Surgery  [facility]

## 2020-08-13 NOTE — PLAN OF CARE
Plan of care reviewed with pt, pt verbalized understanding. IV site clean, dry, intact, no redness or swelling noted. Staples to midline intact, colostomy CDI, brown thin liquid noted in bag . Pt remains free of fall/trauma, Avasys at bedside. Pain is controlled with current regimen. VSS, remains afebrile. Purposeful q2h rounding done throughout shift, safety maintained, call light in reach, bed locked and in lowest position, side rails up x2. Will continue to monitor, observe and report any changes.

## 2020-08-13 NOTE — PLAN OF CARE
Patient remains injury free.  No signs or symptoms of distress noted.  Patient denies any pain or discomfort at this time and will be discharged to home to the care of family.

## 2020-08-13 NOTE — ASSESSMENT & PLAN NOTE
Postop day 5 for from a sigmoid resection and colostomy.      Add clonidine  Regular diet  Hep-Lock IV fluid  Patient is family need additional teaching to be able to change the colostomy bag in case home health is not readily available which is often the case.    Continue colostomy teaching.  Hopefully the patient will be ready for discharge by tomorrow

## 2020-08-13 NOTE — DISCHARGE INSTRUCTIONS
As you have multiple pain medicines at home already you will use these to control your postoperative pain      Please note that your medications have changed.  The cardiologist took you off of the beta blocker because of the low heart rate    Clonidine has been added to control your blood pressure    You need to see your primary care doctor within a week with regard to her blood pressure needs.      Please call for any fever, increase in pain, nausea or vomiting or redness or drainage from incision(s).    No lifting more than 30 pounds for 2 weeks    May shower once dressings are removed  Remove steri strips as they begin to fall off    If you become constipated from the pain medication you can use over the counter laxatives,  Miralax or Glycolax, or Magnesium Citrate for severe constipation.

## 2020-08-13 NOTE — PROGRESS NOTES
Follow up with Mr. Vargas today. Patient is more awake today but still slightly confused. His wife is present at the bedside. LUQ colostomy in place with pouch intact with good seal noted. Small amount of bowel sweat again noted in pouch. Pouch removed so teaching could be performed. Cleansed peristomal area with water and gauze. Stoma is red and moist with some mucosal slough, measures 30mm. Sprayed peristomal area with cavilon. Ostomy ring applied around stoma. One piece [place] pouch then cut to fit and applied to patient. Encouragement provided for patient to assist, he was reluctant but did participate more today. New ileostomy education continued at this time using teach back method. Education reinforced on: emptying and resealing pouch, how and when; changing pouch, how and when; hygiene, activity, and dietary guidelines. Plans made to revisit tomorrow.

## 2020-08-13 NOTE — PROGRESS NOTES
Follow up with  Sam today. LUQ colostomy in place with pouch intact with good seal noted. Small amount of bowel sweat again noted in pouch. Pouch removed so teaching could be performed. Cleansed peristomal area with water and gauze. Stoma is red and moist, measures 30mm. Sprayed peristomal area with cavilon. Ostomy ring applied around stoma. One piece [place] pouch then cut to fit and applied to patient. Patient performed the above with minimal assistance. New ileostomy education reinforced at this time using teach back method. Education reinforced on: emptying and resealing pouch, how and when; changing pouch, how and when; hygiene, activity, and dietary guidelines. patient discharging today. He lives with his daughter who is a nurse and will assist with care.

## 2020-08-13 NOTE — PT/OT/SLP PROGRESS
Physical Therapy      Patient Name:  Henrique Vargas Jr.   MRN:  [id number]    Treatment Time: 6551-6000    S: Patient agreed to PT; wife present for session; Patient with dec appetite; c/o mod soreness during session but with movement only - no pain before or after session at rest  O: Patient came supine to/from sit sba with vc's for log-rolling technique with use of bed rail; Sit to/from stand with sba and cues for safe hand placement & body mechanics; sit bal G on EOB; PT reviewed seated B LE to prep mobility and then patient amb >100ft cga for safety with patient pushing IV pole; O2 sat checked after gt: 94% on RA; PT left O2 off and notified RN of O2 sat - RN to check O2 sat in 30 minutes and decide whether to re-don O2 at that time. Scooting up in bed sba & vc's AND PT also reviewed in bed LE TE to dec stiffness.  A: Patient verbalized understanding of POC but will need review; rec hhpt upon d/c from hospital; dec assist and inc act adriane with gt/mobility today  P: Cont per poc; advance as adriane with exs and mobility training    1GT 1TA 1TE    Darek Patrick, PT

## 2020-08-14 ENCOUNTER — PATIENT OUTREACH (OUTPATIENT)
Dept: ADMINISTRATIVE | Facility: CLINIC | Age: 75
End: 2020-08-14

## 2020-08-14 DIAGNOSIS — Z74.2 NEED FOR HOME HEALTH CARE: Primary | ICD-10-CM

## 2020-08-14 DIAGNOSIS — Z93.3 COLOSTOMY IN PLACE: ICD-10-CM

## 2020-08-14 DIAGNOSIS — Z99.89 WALKER AS AMBULATION AID: ICD-10-CM

## 2020-08-14 DIAGNOSIS — K56.2 VOLVULUS OF SIGMOID COLON: ICD-10-CM

## 2020-08-14 NOTE — TELEPHONE ENCOUNTER
Received message from AXEL Lawson that pt and his wife refused HH stating they don't want anyone in their home and their daughter is a nurse and would provide any care needed.

## 2020-08-14 NOTE — PATIENT INSTRUCTIONS
Discharge Instructions for Colostomy  You just underwent a procedure that required a colostomy. This is a life-saving procedure that involves removing or disconnecting part of your colon (large intestine). If your large intestine was diseased, your healthcare provider may have removed it. If it was injured, your healthcare provider may have disconnected it for a short time so that it can heal. After it heals, your healthcare provider may reconnect it. During a colostomy formation, your healthcare provider reroutes your colon through your abdominal wall. Stool and mucus can then pass out of your body through this opening, called a stoma. The following are general guidelines for home care following a colostomy. Your healthcare provider will go over any information that is specific to your condition.  Home care  Suggestions for home care include the following:  · Take care of your stoma as directed. Your healthcare provider and ostomy nurse discussed how to do this with you before you left the hospital.  · Ask your healthcare provider or ostomy nurse for a patient education sheet about colostomy care before you leave the hospital. This will help remind you how to care for yourself.  · If a partner or significant other will be helping you recover, ask the medical team to educate that person on ostomy care as well.   · Dont lift anything heavier than 5 pounds until your healthcare provider says it is OK.  · Dont drive until after your first healthcare providers appointment after your surgery.  · If you ride in a car for more than short trips, stop often to stretch your legs.  · Ask your healthcare provider when you can expect to return to work. Most patients are able to return to work within 4 to 6 weeks after surgery.  · Increase your activity gradually. Take short walks on a level surface.  · Wash your incision site with soap and water and pat it dry.  · Check your incision every day for redness, drainage,  swelling, or separation of the skin.  · Take your medicines exactly as directed. Dont skip doses.  · Dont take any over-the-counter medicine unless your healthcare provider tells you to do so.  Call your healthcare provider  Call your healthcare provider immediately if you have any of the following:  · Excessive bleeding from your stoma  · Blood in your stool  · Stool that is very hard  · No gas or stool  · Change in the color of your stoma  · Bulging skin around your stoma  · A stoma that looks like its getting longer  · Fever of 100.4°F (38°C) or higher, or chills, or as advised by your healthcare provider   · Redness, swelling, bleeding, or drainage from your incision  · Constipation  · Diarrhea  · Nausea or vomiting  · Increased pain in the belly or around the stoma   Date Last Reviewed: 7/1/2016  © 5531-7321 [employer]. [address] [place] [address], [place] [address]. All rights reserved. This information is not intended as a substitute for professional medical care. Always follow your healthcare professional's instructions.

## 2020-08-14 NOTE — PROGRESS NOTES
Noted chart to say home with HH although no company name given. Daughter states that no one has reached out to them. Urgent outpatient case management referral ordered and message sent to Ana Paula Yoon  to inquire if HH was ordered per case management team.

## 2020-08-17 ENCOUNTER — OUTPATIENT CASE MANAGEMENT (OUTPATIENT)
Dept: ADMINISTRATIVE | Facility: OTHER | Age: 75
End: 2020-08-17

## 2020-08-17 NOTE — PLAN OF CARE
08/17/20 1633   Final Note   Assessment Type Final Discharge Note   Anticipated Discharge Disposition Home   Right Care Referral Info   Post Acute Recommendation No Care

## 2020-08-17 NOTE — PROGRESS NOTES
Attempt #:  1  This LMSW attempted to reach patient/caregiver to provide resource and left a message requesting a return call.

## 2020-08-18 ENCOUNTER — TELEPHONE (OUTPATIENT)
Dept: SURGERY | Facility: HOSPITAL | Age: 75
End: 2020-08-18

## 2020-08-18 DIAGNOSIS — K56.2 VOLVULUS OF SIGMOID COLON: Primary | ICD-10-CM

## 2020-08-18 DIAGNOSIS — Z93.3 COLOSTOMY IN PLACE: ICD-10-CM

## 2020-08-18 DIAGNOSIS — K56.699 DIVERTICULAR STRICTURE: ICD-10-CM

## 2020-08-18 NOTE — PROGRESS NOTES
LMSW received a referral from post discharge nurse Melba Osborn RN indicating patient needs assistance with obtaining HH services. LMSW contacted patient spouse regarding HH orders. Spouse advised this SW she and patient refused HH orders as daughter is an RN while in the hospital. She and patient thought they had all the teaching to care for new colostomy but quickly realized they didnt. Spouse advised this SW daughter who is an RN is not skilled in wound care and is unable to assist.  LMSW sent a message to surgeon Suman to place referral if he is an agreement with request.  Please Spouse is requesting orders to be faxed to [facility] [phone]    2: 15 LMSW followed up with [facility] and spoke with Nazanin [phone]. Nazanin advised this SW no orders have been received. Nazanin asked that this SW fax orders to alternate fax machine [phone]    LMSW faxed orders to [phone]    Plan: LMSW will follow up with  to verify orders have been received

## 2020-08-18 NOTE — TELEPHONE ENCOUNTER
----- Message from Norris Cabrera MA sent at 8/18/2020  1:28 PM CDT -----  Please advise  thanks  ----- Message -----  From: Mimi Mayo LMSW  Sent: 8/18/2020  11:27 AM CDT  To: Suman Post Staff       LMSW received a referral from post discharge nurse Melba Osborn RN indicating patient needs assistance with obtaining HH services. LMSW contacted patient spouse regarding HH orders. Spouse advised this SW she and patient refused HH orders as daughter is an RN while in the hospital. She and patient thought they had all the teaching to care for new colostomy but quickly realized they didn't. Spouse advised this SW daughter who is an RN is not skilled in wound care and is unable to assist.   Please place orders for wound care if you are an agreement with request. Spouse is requesting orders to be faxed to [facility] [phone]      Thank you,    Mimi Mayo LMSW

## 2020-08-19 ENCOUNTER — OUTPATIENT CASE MANAGEMENT (OUTPATIENT)
Dept: ADMINISTRATIVE | Facility: OTHER | Age: 75
End: 2020-08-19

## 2020-08-19 NOTE — PROGRESS NOTES
LMSW followed up with Nazanin with [facility] [phone]. Nazanin reports patient is scheduled to admitted on today. Nazanin reports nurse is in the home as we speak.    LMSW contacted patient spouse. Spouse reports nurse from [facility] is in the home. LMSW will close case.

## 2020-08-20 ENCOUNTER — NURSE TRIAGE (OUTPATIENT)
Dept: ADMINISTRATIVE | Facility: CLINIC | Age: 75
End: 2020-08-20

## 2020-08-20 ENCOUNTER — TELEPHONE (OUTPATIENT)
Dept: SURGERY | Facility: CLINIC | Age: 75
End: 2020-08-20

## 2020-08-20 NOTE — TELEPHONE ENCOUNTER
----- Message from Ely Galeano sent at 8/20/2020  4:53 PM CDT -----  Regarding: Cancel appt  Pt called to cancel appt will be seeing his private doctor.    Mother in law Hodan  can be reached at [phone]      Thank you!

## 2020-08-20 NOTE — TELEPHONE ENCOUNTER
Pt contacted through Post Procedural Symptom Tracking. Denies any cough, fever, or difficulty breathing since procedure.  Pt instructed to call OOC or contact provider with any changes of condition or concerns.       Reason for Disposition   Health Information question, no triage required and triager able to answer question    Additional Information   Negative: [1] Caller is not with the adult (patient) AND [2] reporting urgent symptoms   Negative: Lab result questions   Negative: Medication questions   Negative: Caller can't be reached by phone   Negative: Caller has already spoken to PCP or another triager   Negative: RN needs further essential information from caller in order to complete triage   Negative: Requesting regular office appointment   Negative: [1] Caller requesting NON-URGENT health information AND [2] PCP's office is the best resource    Protocols used: INFORMATION ONLY CALL - NO TRIAGE-A-

## 2020-08-20 NOTE — TELEPHONE ENCOUNTER
Family states that the patient will not be coming in for his post op appointment and rather go back to his gastro doctor.

## 2020-08-21 ENCOUNTER — CARE AT HOME (OUTPATIENT)
Dept: HOME HEALTH SERVICES | Facility: CLINIC | Age: 75
End: 2020-08-21
Payer: MEDICARE

## 2020-08-21 VITALS
DIASTOLIC BLOOD PRESSURE: 62 MMHG | SYSTOLIC BLOOD PRESSURE: 102 MMHG | OXYGEN SATURATION: 94 % | HEART RATE: 68 BPM | TEMPERATURE: 98 F

## 2020-08-21 DIAGNOSIS — Z93.3 COLOSTOMY IN PLACE: ICD-10-CM

## 2020-08-21 DIAGNOSIS — Z76.89 ENCOUNTER FOR SUPPORT AND COORDINATION OF TRANSITION OF CARE: Primary | ICD-10-CM

## 2020-08-21 DIAGNOSIS — K56.2 VOLVULUS OF SIGMOID COLON: ICD-10-CM

## 2020-08-21 PROCEDURE — 99495 TCM SERVICES (MODERATE COMPLEXITY): ICD-10-PCS | Mod: S$GLB,,, | Performed by: NURSE PRACTITIONER

## 2020-08-21 PROCEDURE — 99495 TRANSJ CARE MGMT MOD F2F 14D: CPT | Mod: S$GLB,,, | Performed by: NURSE PRACTITIONER

## 2020-08-21 NOTE — PROGRESS NOTES
[facility]  Transition of Care Home Visit    Visit Date: 8/21/2020  Encounter Provider: Carlin Cartagena NP  PCP:  Bjorn Warner MD    PRESENTING HISTORY      Patient ID: Henrique Vargas Jr. 75 y.o. male.    Consult Requested By:  Dr. Najma De Jesus  Reason for Consult:  Transitional Care Coordination    Chief Complaint: Transitional Care     Admit Date: 08/07/20  Discharge Date: 08/13/20  TCC Referral Date: 08/14/20  __________________________________    Today:  Mr. Henrique Vargas Jr. is a 75 y.o. male is being seen and examined at home today for transitional care visit to the home environment post-discharge from inpatient hospitalization encounter described above. Henrique presents at baseline state of health as reported by patient and caregiver. VSS. Denies any acute issues, concerns or complaints to address on today's visit except for no stool output to colostomy since discharge. Bowel sounds + above colostomy site. Stoma looks healthy, red and moist. They had secured an Rx for Miraalax from GI and gave an initial dose this AM. Flatus reported to bag, appliance is occlusively applied without any skin breakdown noted. Gail SPARKS is in the home at the time of my visit instructing the patient and his wife about colostomy care. Follow-up appointment is established with a GI MD [facility] (Dr. Bruno) on Monday to assume care from Dr. Will. Indira remain intact to midline abdominal incision site , medial to colostomy. Staple puncture sites are slightly red, without drainage or warmth. Staples to be addressed by MD on Monday. Reports taking all medications as prescribed. No other needs identified at this time. Risks of environmental exposure to coronavirus discussed including: social distancing, hand hygiene, and limiting departures from the home for necessities only.  Reports understanding and willingness to comply.      Review of Systems   Constitutional: Positive for  activity change, appetite change and unexpected weight change. Negative for chills and fever.   HENT: Negative.    Respiratory: Negative.  Negative for shortness of breath.    Cardiovascular: Negative.  Negative for chest pain and palpitations.   Gastrointestinal: Negative for abdominal distention, constipation, diarrhea and vomiting.        Flatus to colostomy, no effluent since discharge  Miralax started today per GI   Genitourinary: Negative.    Musculoskeletal: Negative.    Skin: Positive for color change and wound (colostomy ).        Midline abdominal Incision site red around staple sites   Neurological: Positive for weakness.   Psychiatric/Behavioral: Negative.      Assessments:  · Environmental: single story home, no steps to enter, adequate lighting and temeprature control  · Functional Status: Independent with ADL's/IADL's, ambulates independently, continent of bladder, new colostomy  · Safety: Fall Precautions, COVID Precautions/Social Distancing/Mask Use  · Nutritional: Adequate  · Home Health: [facility]  · DME/Supplies: Colostomy Supplies    Ethical / Legal: Advance Care Planning   Capacity to make medical decisions:  yes, Conflict no  · Surrogate decision maker:  Name Jeannine Vargas, Relationship: wife  · Advance Directives:  none  · HCPOA: none  · [place]:  Signed this visit  · Code Status:  full    Advanced Care Directives and HCPOA  forms left in the home for family review, discussion and signing with instructions to return upon their next provider encounter for inclusion to the medical record.     PAST HISTORY:     Past Medical History:   Diagnosis Date    Diverticulitis     Prostate cancer        Past Surgical History:   Procedure Laterality Date    APPENDECTOMY      BACK SURGERY      EARLINE PROCEDURE N/A 8/7/2020    Procedure: EARLINE PROCEDURE;  Surgeon: Sincere Will MD;  Location: [facility];  Service: General;  Laterality: N/A;  resection of sigmoid volvolus and colostomy     PROSTATECTOMY         No family history on file.    Social History     Socioeconomic History    Marital status:      Spouse name: Not on file    Number of children: Not on file    Years of education: Not on file    Highest education level: Not on file   Occupational History    Not on file   Social Needs    Financial resource strain: Not on file    Food insecurity     Worry: Not on file     Inability: Not on file    Transportation needs     Medical: Not on file     Non-medical: Not on file   Tobacco Use    Smoking status: Never Smoker   Substance and Sexual Activity    Alcohol use: No    Drug use: No    Sexual activity: Not on file   Lifestyle    Physical activity     Days per week: Not on file     Minutes per session: Not on file    Stress: Not on file   Relationships    Social connections     Talks on phone: Not on file     Gets together: Not on file     Attends [employer] service: Not on file     Active member of club or organization: Not on file     Attends meetings of clubs or organizations: Not on file     Relationship status: Not on file   Other Topics Concern    Not on file   Social History Narrative    Not on file       MEDICATIONS & ALLERGIES:     Current Outpatient Medications on File Prior to Visit   Medication Sig Dispense Refill    acetaminophen (TYLENOL) 650 MG TbSR Take 650 mg by mouth every 8 (eight) hours.      alendronate (FOSAMAX) 70 MG tablet       cloNIDine (CATAPRES) 0.1 MG tablet Take 1 tablet (0.1 mg total) by mouth 2 (two) times daily. 30 tablet 0     No current facility-administered medications on file prior to visit.         Review of patient's allergies indicates:  No Known Allergies    OBJECTIVE:     Vital Signs:  There were no vitals filed for this visit.  There is no height or weight on file to calculate BMI.     Physical Exam  Vitals signs reviewed.   Constitutional:       General: He is not in acute distress.     Appearance: Normal appearance.   HENT:       Head: Normocephalic.   Eyes:      Extraocular Movements: Extraocular movements intact.      Pupils: Pupils are equal, round, and reactive to light.   Cardiovascular:      Rate and Rhythm: Normal rate.      Pulses: Normal pulses.   Pulmonary:      Effort: Pulmonary effort is normal.      Breath sounds: Normal breath sounds.   Abdominal:      General: Abdomen is flat. There is no distension.      Tenderness: There is abdominal tenderness. There is guarding.          Comments: Bowel sounds above new colostomy site  Stoma healthy pink with appliance attached occlusively  No stool noted to collection bag  Staples intact to midline abdominal incision, medial to colostomy site, skin around each staple entry site is slightly red, dry to touch, no drainage, not warm to touch     Musculoskeletal: Normal range of motion.   Skin:     General: Skin is warm.      Capillary Refill: Capillary refill takes less than 2 seconds.   Neurological:      General: No focal deficit present.      Mental Status: He is alert and oriented to person, place, and time.   Psychiatric:         Mood and Affect: Mood normal.       Laboratory  Lab Results   Component Value Date    WBC 9.24 08/11/2020    HGB 10.0 (L) 08/11/2020    HCT 32.7 (L) 08/11/2020    MCV 96 08/11/2020     08/11/2020     Lab Results   Component Value Date    INR 1.1 10/09/2015     No results found for: HGBA1C  No results for input(s): POCTGLUCOSE in the last 72 hours.    TRANSITION OF CARE:     Family and/or Caretaker present at visit?  Yes.  Diagnostic tests reviewed/disposition: No diagnosic tests pending after this hospitalization.  Disease/illness education: Importance of Compliance with all prescribed medications and treatments, COVID Precautions/Social Distancing/Mask Use  Home health/community services discussion/referrals: Patient [facility] health [facility] at [facility]   Establishment or re-establishment of referral orders for community resources: No other  necessary community resources.   Discussion with other health care providers: No discussion with other health care providers necessary.     Transition of Care Visit:  I have reviewed and updated the history and problem list. I have reconciled the medication list. I have discussed the hospitalization and current medical issues, prognosis and plans with the patient/family.     I spent more than 50% of time discussing the care with the patient/family. Total Face-to-Face Encounter: 80 minutes.    Medications Reconciliation:   I have reconciled the patient's home medications and discharge medications with the patient/family. I have updated all changes. Refer to After-Visit Medication List.    Discharge plans, follow-up instructions, future appointments, provider contact information, indicators to seek emergency treatment and encouragement to call for any questions, concerns or clarification of the patient's plan of care explained to patient and/or caregiver(s), whom confirm understanding of provided information and endorse willingness to comply.     ASSESSMENT & PLAN:     Diagnoses and all orders for this visit:    Volvulus of sigmoid colon  Colostomy in place  - colostomy care as taught by [facility]  - monitor for stool output  - continue Miralax as ordered by GI  - GI appointment follow up in Monday  - staples to be addressed with MD then    Encounter for Support and Coordination of Transition of Care   - [facility] Nurse Practitioner to schedule home visit with patient PRN    Were controlled substances prescribed?  No    Instructions for the patient:    Scheduled Follow-up :  Future Appointments   Date Time Provider Department Center   8/21/2020 11:00 AM Carlin Villalobos NP [facility] [place]       After Visit Medication List :     Medication List          Accurate as of August 21, 2020  9:06 AM. If you have any questions, ask your nurse or doctor.            CONTINUE taking these medications     acetaminophen 650 MG Tbsr  Commonly known as: TYLENOL     alendronate 70 MG tablet  Commonly known as: FOSAMAX     cloNIDine 0.1 MG tablet  Commonly known as: CATAPRES  Take 1 tablet (0.1 mg total) by mouth 2 (two) times daily.          Patient consent was obtained prior to treatment on this visit.    Attestation: Screening criteria to assess the level of the patient's risk for infection with COVID-19 as recommended by the CDC at the time of the above documented home visit concluded appropriateness to proceed. Universal precautions were maintained at all times, including provider use of >60% alcohol gel hand  immediately prior to entry and upon departing the patient's home as well as cleaning of equipment used in home visit with antibacterial/germicidal disposable wipes.     Signature:      Carlin Cartagena, MSN, APRN, FNP-C  [facility]    With the consent of the patient and/or caregiver(s), an additional 20 minutes included a comprehensive discussion regarding Advanced Care Planning. Sources of emotional and spiritual support were identified and encouraged for involvement to assist in finalization of decisions regarding the patient's goals of care (Living Will). Topics discussed include statutory guidelines of the [facility] to determine/delineate the legal surrogate medical decision maker should the patient be deemed incompetent to self-direct medical care (next of kin vs.POA) and the required documentation to ensure legal validity thereof (Advanced Directives/[place]).

## 2020-08-21 NOTE — TELEPHONE ENCOUNTER
Family is still refusing to come in for a post op appointment, she states that they have a doctor that will take the staples out and follow-up with him. I did offer for the patient to see Dr. Will or one of the other providers, but she still refused.

## 2020-08-21 NOTE — PATIENT INSTRUCTIONS
- [facility] at  to schedule follow-up visit with patient as needed.  - Continue all medications, treatments and therapies as ordered.   - Follow all instructions, recommendations as discussed.  - Maintain Safety Precautions at all times.  - Attend all medical appointments as scheduled.  - For worsening symptoms: call Primary Care Physician or Nurse Practitioner.  - For emergencies, call 911 or immediately report to the nearest emergency room.  - Limit Risks of environmental exposure to coronavirus as discussed including: social distancing, hand hygiene, and limiting departures from the home for necessities only.     Follow up with Dr. Bruno (Surgery) in the [facility] as scheduled on Monday, August 24 at 14:15 PM.

## 2020-08-26 ENCOUNTER — TELEPHONE (OUTPATIENT)
Dept: SURGERY | Facility: CLINIC | Age: 75
End: 2020-08-26

## 2020-08-26 NOTE — TELEPHONE ENCOUNTER
Spoke to Cortney with [facility], she ws advised per Dr. Will that the patient needs to contact his PCP Re: blood pressure medications, she voiced understanding.

## 2020-08-26 NOTE — TELEPHONE ENCOUNTER
----- Message from Vika Briscoe MA sent at 8/26/2020 11:26 AM CDT -----  Regarding: FW: [facility]    ----- Message -----  From: Abimbola Herbert  Sent: 8/25/2020   2:42 PM CDT  To: [facility] Staff  Subject: [facility]                         Would like to consult with nurse regarding pt being out of cloNIDine (CATAPRES) 0.1 MG tablet, and having no more refills. Please give a call back at [phone].

## 2022-12-26 ENCOUNTER — HOSPITAL ENCOUNTER (EMERGENCY)
Facility: HOSPITAL | Age: 77
Discharge: HOME | End: 2022-12-27
Attending: EMERGENCY MEDICINE
Payer: MEDICARE

## 2022-12-26 VITALS
OXYGEN SATURATION: 98 % | DIASTOLIC BLOOD PRESSURE: 73 MMHG | SYSTOLIC BLOOD PRESSURE: 127 MMHG | TEMPERATURE: 99 F | WEIGHT: 140.63 LBS | RESPIRATION RATE: 10 BRPM | HEART RATE: 52 BPM | BODY MASS INDEX: 22.07 KG/M2 | HEIGHT: 67 IN

## 2022-12-26 DIAGNOSIS — I10 HTN (HYPERTENSION): ICD-10-CM

## 2022-12-26 DIAGNOSIS — R00.1 BRADYCARDIA: Primary | ICD-10-CM

## 2022-12-26 LAB
ALBUMIN SERPL BCP-MCNC: 4 G/DL (ref 3.5–5.2)
ALP SERPL-CCNC: 36 U/L (ref 55–135)
ALT SERPL W/O P-5'-P-CCNC: 9 U/L (ref 10–44)
ANION GAP SERPL CALC-SCNC: 11 MMOL/L (ref 8–16)
AST SERPL-CCNC: 22 U/L (ref 10–40)
BASOPHILS # BLD AUTO: 0.07 K/UL (ref 0–0.2)
BASOPHILS NFR BLD: 1.1 % (ref 0–1.9)
BILIRUB SERPL-MCNC: 0.4 MG/DL (ref 0.1–1)
BUN SERPL-MCNC: 17 MG/DL (ref 8–23)
CALCIUM SERPL-MCNC: 9.7 MG/DL (ref 8.7–10.5)
CHLORIDE SERPL-SCNC: 104 MMOL/L (ref 95–110)
CO2 SERPL-SCNC: 25 MMOL/L (ref 23–29)
CREAT SERPL-MCNC: 1.2 MG/DL (ref 0.5–1.4)
DIFFERENTIAL METHOD: ABNORMAL
EOSINOPHIL # BLD AUTO: 1.2 K/UL (ref 0–0.5)
EOSINOPHIL NFR BLD: 17.9 % (ref 0–8)
ERYTHROCYTE [DISTWIDTH] IN BLOOD BY AUTOMATED COUNT: 13.2 % (ref 11.5–14.5)
EST. GFR  (NO RACE VARIABLE): >60 ML/MIN/1.73 M^2
GLUCOSE SERPL-MCNC: 81 MG/DL (ref 70–110)
HCT VFR BLD AUTO: 34.1 % (ref 40–54)
HGB BLD-MCNC: 11 G/DL (ref 14–18)
IMM GRANULOCYTES # BLD AUTO: 0.01 K/UL (ref 0–0.04)
IMM GRANULOCYTES NFR BLD AUTO: 0.2 % (ref 0–0.5)
LYMPHOCYTES # BLD AUTO: 1.3 K/UL (ref 1–4.8)
LYMPHOCYTES NFR BLD: 19.6 % (ref 18–48)
MCH RBC QN AUTO: 30 PG (ref 27–31)
MCHC RBC AUTO-ENTMCNC: 32.3 G/DL (ref 32–36)
MCV RBC AUTO: 93 FL (ref 82–98)
MONOCYTES # BLD AUTO: 0.5 K/UL (ref 0.3–1)
MONOCYTES NFR BLD: 7.7 % (ref 4–15)
NEUTROPHILS # BLD AUTO: 3.5 K/UL (ref 1.8–7.7)
NEUTROPHILS NFR BLD: 53.5 % (ref 38–73)
NRBC BLD-RTO: 0 /100 WBC
PLATELET # BLD AUTO: 233 K/UL (ref 150–450)
PMV BLD AUTO: 9.4 FL (ref 9.2–12.9)
POTASSIUM SERPL-SCNC: 4.5 MMOL/L (ref 3.5–5.1)
PROT SERPL-MCNC: 7.6 G/DL (ref 6–8.4)
RBC # BLD AUTO: 3.67 M/UL (ref 4.6–6.2)
SODIUM SERPL-SCNC: 140 MMOL/L (ref 136–145)
WBC # BLD AUTO: 6.49 K/UL (ref 3.9–12.7)

## 2022-12-26 PROCEDURE — 85025 COMPLETE CBC W/AUTO DIFF WBC: CPT | Performed by: EMERGENCY MEDICINE

## 2022-12-26 PROCEDURE — 93005 ELECTROCARDIOGRAM TRACING: CPT

## 2022-12-26 PROCEDURE — 93010 ELECTROCARDIOGRAM REPORT: CPT | Mod: ,,, | Performed by: STUDENT IN AN ORGANIZED HEALTH CARE EDUCATION/TRAINING PROGRAM

## 2022-12-26 PROCEDURE — 96374 THER/PROPH/DIAG INJ IV PUSH: CPT

## 2022-12-26 PROCEDURE — 63600175 PHARM REV CODE 636 W HCPCS: Performed by: EMERGENCY MEDICINE

## 2022-12-26 PROCEDURE — 99284 EMERGENCY DEPT VISIT MOD MDM: CPT | Mod: 25

## 2022-12-26 PROCEDURE — 80053 COMPREHEN METABOLIC PANEL: CPT | Performed by: EMERGENCY MEDICINE

## 2022-12-26 PROCEDURE — 93010 EKG 12-LEAD: ICD-10-PCS | Mod: ,,, | Performed by: STUDENT IN AN ORGANIZED HEALTH CARE EDUCATION/TRAINING PROGRAM

## 2022-12-26 RX ORDER — HYDRALAZINE HYDROCHLORIDE 20 MG/ML
10 INJECTION INTRAMUSCULAR; INTRAVENOUS
Status: COMPLETED | OUTPATIENT
Start: 2022-12-26 | End: 2022-12-26

## 2022-12-26 RX ORDER — VALSARTAN 80 MG/1
80 TABLET ORAL DAILY
Qty: 90 TABLET | Refills: 3 | Status: SHIPPED | OUTPATIENT
Start: 2022-12-26 | End: 2023-12-26

## 2022-12-26 RX ADMIN — HYDRALAZINE HYDROCHLORIDE 10 MG: 20 INJECTION, SOLUTION INTRAMUSCULAR; INTRAVENOUS at 10:12

## 2022-12-27 NOTE — ED PROVIDER NOTES
SCRIBE #1 NOTE: I, Chris Sanon, am scribing for, and in the presence of, Komal Abdi MD. I have scribed the entire note.       History     Chief Complaint   Patient presents with    Hypertension     Review of patient's allergies indicates:  No Known Allergies      History of Present Illness     HPI    12/26/2022, 9:57 PM  History obtained from the wife and patient      History of Present Illness: Henrique Vargas Jr. is a 77 y.o. male patient with a PMHx of diverticulitis, HTN, and prostate cancer who presents to the Emergency Department for evaluation of hypertension. Pt' wife reports that pt has not been able to control his BP and was bradycardic today. Symptoms are constant and moderate in severity. No mitigating or exacerbating factors reported. Associated sxs include a HA. Patient denies any fever, chills, nausea, vomiting, CP, SOB, and all other sxs at this time. Pt takes atenolol for his BP. No further complaints or concerns at this time.       Arrival mode: Personal vehicle     PCP: Bjorn Warner MD        Past Medical History:  Past Medical History:   Diagnosis Date    Diverticulitis     Prostate cancer        Past Surgical History:  Past Surgical History:   Procedure Laterality Date    APPENDECTOMY      BACK SURGERY      EARLINE PROCEDURE N/A 8/7/2020    Procedure: EARLINE PROCEDURE;  Surgeon: Sincere Will MD;  Location: [facility];  Service: General;  Laterality: N/A;  resection of sigmoid volvolus and colostomy    PROSTATECTOMY           Family History:  No family history on file.    Social History:  Social History     Tobacco Use    Smoking status: Never    Smokeless tobacco: Not on file   Substance and Sexual Activity    Alcohol use: No    Drug use: No    Sexual activity: Not on file        Review of Systems     Review of Systems   Constitutional:  Negative for chills and fever.   HENT:  Negative for sore throat.    Respiratory:  Negative for shortness of breath.    Cardiovascular:  Negative  "for chest pain.        (+) Bradycardia   Gastrointestinal:  Negative for nausea and vomiting.   Genitourinary:  Negative for dysuria.   Musculoskeletal:  Negative for back pain.   Skin:  Negative for rash.   Neurological:  Positive for headaches. Negative for weakness.   Hematological:  Does not bruise/bleed easily.   All other systems reviewed and are negative.     Physical Exam     Initial Vitals   BP Pulse Resp Temp SpO2   12/26/22 2037 12/26/22 2037 12/26/22 2037 12/26/22 2219 12/26/22 2037   (!) 157/84 (!) 49 18 98.9 °F (37.2 °C) 95 %      MAP       --                 Physical Exam  Nursing Notes and Vital Signs Reviewed.  Constitutional: Patient is in no acute distress. Well-developed and well-nourished.  Head: Atraumatic. Normocephalic.  Eyes: PERRL. EOM intact. Conjunctivae are not pale. No scleral icterus.  ENT: Mucous membranes are moist. Oropharynx is clear and symmetric.    Neck: Supple. Full ROM. No lymphadenopathy.  Cardiovascular: Bradycardic. Regular rhythm. No murmurs, rubs, or gallops. Distal pulses are 2+ and symmetric.  Pulmonary/Chest: No respiratory distress. Clear to auscultation bilaterally. No wheezing or rales.  Abdominal: Soft and non-distended.  There is no tenderness.  No rebound, guarding, or rigidity.   Musculoskeletal: Moves all extremities. No obvious deformities. No edema. No calf tenderness.  Skin: Warm and dry.  Neurological:  Alert, awake, and appropriate.  Normal speech.  No acute focal neurological deficits are appreciated.  Psychiatric: Normal affect. Good eye contact. Appropriate in content.     ED Course   Procedures  ED Vital Signs:  Vitals:    12/26/22 2037 12/26/22 2158 12/26/22 2217 12/26/22 2219   BP: (!) 157/84 (!) 188/90 (!) 180/86    Pulse: (!) 49 (!) 44 (!) 44    Resp: 18 15 16    Temp:    98.9 °F (37.2 °C)   TempSrc: Oral   Oral   SpO2: 95% 97% 97%    Weight: 63.8 kg (140 lb 10.5 oz)      Height: 5' 7" (1.702 m)       12/26/22 2241 12/26/22 2332   BP: (!) 173/84 " 127/73   Pulse: (!) 46 (!) 52   Resp: 11 10   Temp:     TempSrc:     SpO2: 97% 98%   Weight:     Height:         Abnormal Lab Results:  Labs Reviewed   CBC W/ AUTO DIFFERENTIAL - Abnormal; Notable for the following components:       Result Value    RBC 3.67 (*)     Hemoglobin 11.0 (*)     Hematocrit 34.1 (*)     Eos # 1.2 (*)     Eosinophil % 17.9 (*)     All other components within normal limits   COMPREHENSIVE METABOLIC PANEL - Abnormal; Notable for the following components:    Alkaline Phosphatase 36 (*)     ALT 9 (*)     All other components within normal limits        All Lab Results:  Results for orders placed or performed during the hospital encounter of 12/26/22   CBC auto differential   Result Value Ref Range    WBC 6.49 3.90 - 12.70 K/uL    RBC 3.67 (L) 4.60 - 6.20 M/uL    Hemoglobin 11.0 (L) 14.0 - 18.0 g/dL    Hematocrit 34.1 (L) 40.0 - 54.0 %    MCV 93 82 - 98 fL    MCH 30.0 27.0 - 31.0 pg    MCHC 32.3 32.0 - 36.0 g/dL    RDW 13.2 11.5 - 14.5 %    Platelets 233 150 - 450 K/uL    MPV 9.4 9.2 - 12.9 fL    Immature Granulocytes 0.2 0.0 - 0.5 %    Gran # (ANC) 3.5 1.8 - 7.7 K/uL    Immature Grans (Abs) 0.01 0.00 - 0.04 K/uL    Lymph # 1.3 1.0 - 4.8 K/uL    Mono # 0.5 0.3 - 1.0 K/uL    Eos # 1.2 (H) 0.0 - 0.5 K/uL    Baso # 0.07 0.00 - 0.20 K/uL    nRBC 0 0 /100 WBC    Gran % 53.5 38.0 - 73.0 %    Lymph % 19.6 18.0 - 48.0 %    Mono % 7.7 4.0 - 15.0 %    Eosinophil % 17.9 (H) 0.0 - 8.0 %    Basophil % 1.1 0.0 - 1.9 %    Differential Method Automated    Comprehensive metabolic panel   Result Value Ref Range    Sodium 140 136 - 145 mmol/L    Potassium 4.5 3.5 - 5.1 mmol/L    Chloride 104 95 - 110 mmol/L    CO2 25 23 - 29 mmol/L    Glucose 81 70 - 110 mg/dL    BUN 17 8 - 23 mg/dL    Creatinine 1.2 0.5 - 1.4 mg/dL    Calcium 9.7 8.7 - 10.5 mg/dL    Total Protein 7.6 6.0 - 8.4 g/dL    Albumin 4.0 3.5 - 5.2 g/dL    Total Bilirubin 0.4 0.1 - 1.0 mg/dL    Alkaline Phosphatase 36 (L) 55 - 135 U/L    AST 22 10 - 40 U/L     ALT 9 (L) 10 - 44 U/L    Anion Gap 11 8 - 16 mmol/L    eGFR >60 >60 mL/min/1.73 m^2         Imaging Results:  Imaging Results    None        The EKG was ordered, reviewed, and independently interpreted by the ED provider.  Interpretation time: 10:31  Rate: 44 BPM  Rhythm: Marked sinus bradycardia with 1st degree AV block  Interpretation: No ST or T wave abnormality. No STEMI.           The Emergency Provider reviewed the vital signs and test results, which are outlined above.     ED Discussion       11:49 PM: Reassessed pt at this time.  Discussed with pt all pertinent ED information and results. Discussed pt dx and plan of tx. Gave pt all f/u and return to the ED instructions. All questions and concerns were addressed at this time. Pt expresses understanding of information and instructions, and is comfortable with plan to discharge. Pt is stable for discharge.    I discussed with patient and/or family/caretaker that evaluation in the ED does not suggest any emergent or life threatening medical conditions requiring immediate intervention beyond what was provided in the ED, and I believe patient is safe for discharge.  Regardless, an unremarkable evaluation in the ED does not preclude the development or presence of a serious of life threatening condition. As such, patient was instructed to return immediately for any worsening or change in current symptoms.         Medical Decision Making:   Clinical Tests:   Lab Tests: Ordered and Reviewed  Medical Tests: Ordered and Reviewed         ED Medication(s):  Medications   hydrALAZINE injection 10 mg (10 mg Intravenous Given 12/26/22 2234)       Discharge Medication List as of 12/26/2022 11:51 PM        START taking these medications    Details   valsartan (DIOVAN) 80 MG tablet Take 1 tablet (80 mg total) by mouth once daily., Starting Mon 12/26/2022, Until Tue 12/26/2023, Print              Follow-up Information       Bjorn Warner MD In 2 days.    Specialty:  Family Medicine  Contact information:  [address]  [facility] [address]  [phone]               [facility] - Emergency Dept..    Specialty: Emergency Medicine  Why: As needed, If symptoms worsen  Contact information:  [address]  [facility] [address] [phone]                               Scribe Attestation:   Scribe #1: I performed the above scribed service and the documentation accurately describes the services I performed. I attest to the accuracy of the note.     Attending:   Physician Attestation Statement for Scribe #1: I, Komal Abdi MD, personally performed the services described in this documentation, as scribed by Chris Sanon, in my presence, and it is both accurate and complete.           Clinical Impression       ICD-10-CM ICD-9-CM   1. Bradycardia  R00.1 427.89   2. HTN (hypertension)  I10 401.9       Disposition:   Disposition: Discharged  Condition: Stable       Komal Abdi MD  01/03/23 2028

## 2022-12-29 ENCOUNTER — HOSPITAL ENCOUNTER (EMERGENCY)
Facility: HOSPITAL | Age: 77
Discharge: HOME | End: 2022-12-29
Attending: EMERGENCY MEDICINE
Payer: MEDICARE

## 2022-12-29 VITALS
BODY MASS INDEX: 21.82 KG/M2 | WEIGHT: 139.31 LBS | HEART RATE: 68 BPM | OXYGEN SATURATION: 97 % | TEMPERATURE: 98 F | SYSTOLIC BLOOD PRESSURE: 117 MMHG | RESPIRATION RATE: 16 BRPM | DIASTOLIC BLOOD PRESSURE: 62 MMHG

## 2022-12-29 DIAGNOSIS — R03.0 ELEVATED BLOOD PRESSURE READING: Primary | ICD-10-CM

## 2022-12-29 PROCEDURE — 99282 EMERGENCY DEPT VISIT SF MDM: CPT

## 2022-12-29 NOTE — ED PROVIDER NOTES
History      Chief Complaint   Patient presents with    Hypertension     Pt. Reports that he has been having high BP at home. He is worried about the numbers.        Review of patient's allergies indicates:  No Known Allergies     HPI   HPI    12/29/2022, 1:58 PM   History obtained from the patient      History of Present Illness: Henrique Vargas Jr. is a 77 y.o. male patient who presents to the Emergency Department for elevated blood pressure reading at home 200/100.  He called his doctor who adjusted his dose over the phone.  Denies chest pain, shortness of breath, headache, dizziness. Symptoms are moderate in severity.     No further complaints or concerns at this time.           PCP: Bjorn Warner MD       Past Medical History:  Past Medical History:   Diagnosis Date    Diverticulitis     Prostate cancer          Past Surgical History:  Past Surgical History:   Procedure Laterality Date    APPENDECTOMY      BACK SURGERY      EARLINE PROCEDURE N/A 8/7/2020    Procedure: EARLINE PROCEDURE;  Surgeon: Sincere Will MD;  Location: [facility];  Service: General;  Laterality: N/A;  resection of sigmoid volvolus and colostomy    PROSTATECTOMY             Family History:  No family history on file.        Social History:  Social History     Tobacco Use    Smoking status: Never    Smokeless tobacco: Not on file   Substance and Sexual Activity    Alcohol use: No    Drug use: No    Sexual activity: Not on file       ROS     Review of Systems   Constitutional:  Negative for chills and fever.   HENT:  Negative for facial swelling and trouble swallowing.    Eyes:  Negative for pain and discharge.   Respiratory:  Negative for chest tightness and shortness of breath.    Cardiovascular:  Negative for chest pain, palpitations and leg swelling.   Gastrointestinal:  Negative for diarrhea and vomiting.   Endocrine: Negative for polydipsia and polyuria.   Genitourinary:  Negative for decreased urine volume and flank pain.    Musculoskeletal:  Negative for joint swelling and neck stiffness.   Skin:  Negative for rash and wound.   Neurological:  Negative for syncope, facial asymmetry, light-headedness, numbness and headaches.   All other systems reviewed and are negative.    Physical Exam      Initial Vitals [12/29/22 1357]   BP Pulse Resp Temp SpO2   117/62 68 16 98.2 °F (36.8 °C) 97 %      MAP       --         Physical Exam  Vital signs and nursing notes reviewed.  Constitutional: Patient is in NAD. Awake and alert. Well-developed and well-nourished.  Head: Atraumatic. Normocephalic.  Eyes: PERRL. EOM intact. Conjunctivae nl. No scleral icterus.  ENT: Mucous membranes are moist. Oropharynx is clear.  Neck: Supple. No JVD. No lymphadenopathy.  No meningismus  Cardiovascular: Regular rate and rhythm. No murmurs, rubs, or gallops. Distal pulses are 2+ and symmetric.  Pulmonary/Chest: No respiratory distress. Clear to auscultation bilaterally. No wheezing, rales, or rhonchi.  Abdominal: Soft. Non-distended. No TTP. No rebound, guarding, or rigidity. Good bowel sounds.  Genitourinary: No CVA tenderness  Musculoskeletal: Moves all extremities. No edema.   Skin: Warm and dry.  Neurological: Awake and alert. No acute focal neurological deficits are appreciated.  Psychiatric: Normal affect. Good eye contact. Appropriate in content.      ED Course          Procedures  ED Vital Signs:  Vitals:    12/29/22 1357   BP: 117/62   Pulse: 68   Resp: 16   Temp: 98.2 °F (36.8 °C)   TempSrc: Oral   SpO2: 97%   Weight: 63.2 kg (139 lb 5.3 oz)                 Imaging Results:  Imaging Results    None            The Emergency Provider reviewed the vital signs and test results, which are outlined above.    ED Discussion             Medication(s) given in the ER:  Medications - No data to display         Follow-up Information       Bjorn Warner MD In 1 day.    Specialty: Family Medicine  Contact information:  [address] [facility]  [facility] [address]  [phone]                                    Medication List        ASK your doctor about these medications      acetaminophen 650 MG Tbsr  Commonly known as: TYLENOL     alendronate 70 MG tablet  Commonly known as: FOSAMAX     cloNIDine 0.1 MG tablet  Commonly known as: CATAPRES  Take 1 tablet (0.1 mg total) by mouth 2 (two) times daily.     valsartan 80 MG tablet  Commonly known as: DIOVAN  Take 1 tablet (80 mg total) by mouth once daily.                  Medical Decision Making        All findings were reviewed with the patient/family in detail.   All remaining questions and concerns were addressed at that time.  Patient/family has been counseled regarding the need for follow-up as well as the indication to return to the emergency room should new or worrisome developments occur.        MDM                 Clinical Impression:        ICD-10-CM ICD-9-CM   1. Elevated blood pressure reading  R03.0 796.2               Joan Grover PA-C  12/29/22 1402

## 2024-08-02 ENCOUNTER — HOSPITAL ENCOUNTER (EMERGENCY)
Facility: HOSPITAL | Age: 79
Discharge: HOME | End: 2024-08-02
Attending: EMERGENCY MEDICINE
Payer: MEDICARE

## 2024-08-02 VITALS
TEMPERATURE: 98 F | DIASTOLIC BLOOD PRESSURE: 76 MMHG | WEIGHT: 116.88 LBS | RESPIRATION RATE: 20 BRPM | HEART RATE: 55 BPM | SYSTOLIC BLOOD PRESSURE: 142 MMHG | OXYGEN SATURATION: 94 % | BODY MASS INDEX: 18.3 KG/M2
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
## 2024-08-02 PROCEDURE — 99284 EMERGENCY DEPT VISIT MOD MDM: CPT | Mod: 25

## 2024-08-02 PROCEDURE — 25000003 PHARM REV CODE 250: Performed by: NURSE PRACTITIONER

## 2024-08-02 RX ORDER — HYDROCODONE BITARTRATE AND ACETAMINOPHEN 7.5; 325 MG/1; MG/1
1 TABLET ORAL ONCE
Status: COMPLETED | OUTPATIENT
Start: 2024-08-02 | End: 2024-08-02

## 2024-08-02 RX ORDER — HYDROCODONE BITARTRATE AND ACETAMINOPHEN 5; 325 MG/1; MG/1
1 TABLET ORAL EVERY 6 HOURS PRN
Qty: 8 TABLET | Refills: 0 | Status: SHIPPED | OUTPATIENT
Start: 2024-08-02

## 2024-08-02 RX ADMIN — HYDROCODONE BITARTRATE AND ACETAMINOPHEN 1 TABLET: 7.5; 325 TABLET ORAL at 10:08

## 2025-01-01 ENCOUNTER — HOSPITAL ENCOUNTER (EMERGENCY)
Facility: HOSPITAL | Age: 80
Discharge: HOME | End: 2025-01-01
Attending: EMERGENCY MEDICINE
Payer: MEDICARE

## 2025-01-01 VITALS
WEIGHT: 123 LBS | DIASTOLIC BLOOD PRESSURE: 82 MMHG | RESPIRATION RATE: 18 BRPM | TEMPERATURE: 98 F | OXYGEN SATURATION: 96 % | BODY MASS INDEX: 19.3 KG/M2 | HEART RATE: 93 BPM | SYSTOLIC BLOOD PRESSURE: 131 MMHG | HEIGHT: 67 IN
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
## 2025-01-01 PROCEDURE — 25000003 PHARM REV CODE 250: Performed by: NURSE PRACTITIONER

## 2025-01-01 PROCEDURE — 99284 EMERGENCY DEPT VISIT MOD MDM: CPT | Mod: 25

## 2025-01-01 RX ORDER — ATENOLOL 25 MG/1
25 TABLET ORAL 2 TIMES DAILY
COMMUNITY

## 2025-01-01 RX ORDER — METOPROLOL TARTRATE 25 MG/1
25 TABLET, FILM COATED ORAL
COMMUNITY
Start: 2024-12-09

## 2025-01-01 RX ORDER — TRAZODONE HYDROCHLORIDE 100 MG/1
100 TABLET ORAL NIGHTLY PRN
COMMUNITY
Start: 2024-12-09

## 2025-01-01 RX ORDER — HYDROCODONE BITARTRATE AND ACETAMINOPHEN 5; 325 MG/1; MG/1
1 TABLET ORAL
Status: COMPLETED | OUTPATIENT
Start: 2025-01-01 | End: 2025-01-01

## 2025-01-01 RX ORDER — ROSUVASTATIN CALCIUM 40 MG/1
40 TABLET, COATED ORAL DAILY
COMMUNITY

## 2025-01-01 RX ORDER — LOSARTAN POTASSIUM 25 MG/1
25 TABLET ORAL DAILY
COMMUNITY

## 2025-01-01 RX ORDER — HYDROCODONE BITARTRATE AND ACETAMINOPHEN 7.5; 325 MG/1; MG/1
1 TABLET ORAL EVERY 6 HOURS PRN
Qty: 11 TABLET | Refills: 0 | Status: SHIPPED | OUTPATIENT
Start: 2025-01-01 | End: 2025-01-11

## 2025-01-01 RX ORDER — MEGESTROL ACETATE 20 MG/1
20 TABLET ORAL 2 TIMES DAILY
COMMUNITY

## 2025-01-01 RX ORDER — PANTOPRAZOLE SODIUM 40 MG/1
40 TABLET, DELAYED RELEASE ORAL DAILY
COMMUNITY

## 2025-01-01 RX ORDER — CLOPIDOGREL BISULFATE 75 MG/1
75 TABLET ORAL DAILY
COMMUNITY

## 2025-01-01 RX ADMIN — HYDROCODONE BITARTRATE AND ACETAMINOPHEN 1 TABLET: 5; 325 TABLET ORAL at 12:01

## 2025-01-02 NOTE — ED PROVIDER NOTES
Encounter Date: 1/1/2025       History     Chief Complaint   Patient presents with    Fall     Pt tripped and fell yesterday onto left side. C/o severe pain to left arm and left hip. Denies blood thinners, takes aspirin. Denies hitting head.      Patient states he tripped and fell yesterday complaining of left hip pain, lower back pain, neck pain.  Patient states he did not hit his head does not have a headache and is not confused.  Denies any loss of bowel or bladder control or saddle anesthesia        Review of patient's allergies indicates:  No Known Allergies  Past Medical History:   Diagnosis Date    Diverticulitis     Prostate cancer      Past Surgical History:   Procedure Laterality Date    APPENDECTOMY      BACK SURGERY      EARLINE PROCEDURE N/A 8/7/2020    Procedure: EARLINE PROCEDURE;  Surgeon: Sincere Will MD;  Location: [facility];  Service: General;  Laterality: N/A;  resection of sigmoid volvolus and colostomy    PROSTATECTOMY       No family history on file.  Social History     Tobacco Use    Smoking status: Never   Substance Use Topics    Alcohol use: No    Drug use: No     Review of Systems   Constitutional:  Negative for fever.   HENT:  Negative for sore throat.    Respiratory:  Negative for shortness of breath.    Cardiovascular:  Negative for chest pain.   Gastrointestinal:  Negative for nausea.   Genitourinary:  Negative for dysuria.   Musculoskeletal:  Negative for back pain.   Skin:  Negative for rash.   Neurological:  Negative for weakness.   Hematological:  Does not bruise/bleed easily.       Physical Exam     Initial Vitals [01/01/25 1205]   BP Pulse Resp Temp SpO2   131/82 93 18 97.6 °F (36.4 °C) 96 %      MAP       --         Physical Exam    Nursing note and vitals reviewed.  Constitutional: He appears well-developed and well-nourished. He is not diaphoretic. No distress.   HENT:   Head: Normocephalic and atraumatic.   Eyes: Conjunctivae and EOM are normal. Pupils are equal, round,  and reactive to light.   Neck: Neck supple.   Normal range of motion.  Cardiovascular:  Normal rate, regular rhythm, normal heart sounds and intact distal pulses.           Pulmonary/Chest: Breath sounds normal. No respiratory distress.   Abdominal: Abdomen is soft. He exhibits no distension. There is no rebound and no guarding.   Musculoskeletal:         General: Normal range of motion.      Cervical back: Normal range of motion and neck supple.     Neurological: He is alert and oriented to person, place, and time. He has normal strength. No cranial nerve deficit. GCS score is 15. GCS eye subscore is 4. GCS verbal subscore is 5. GCS motor subscore is 6.   Skin: Skin is warm and dry. No rash noted.   Psychiatric: He has a normal mood and affect. His behavior is normal. Thought content normal.         ED Course   Procedures  Labs Reviewed - No data to display       Imaging Results              X-Ray Hip 2 or 3 views Left with Pelvis when performed (Final result)  Result time 01/01/25 13:33:21      Final result by Bjorn Hawley MD (01/01/25 13:33:21)                   Impression:      No acute abnormality of the left hip.    Finalized on: 1/1/2025 1:33 PM By:  Bjorn Hawley MD  [facility]# [id number]      2025-01-01 13:35:26.987    [facility]               Narrative:    EXAM: XR HIP WITH PELVIS WHEN PERFORMED 2 OR 3 VIEWS LEFT    CLINICAL HISTORY: Injury hip/thigh (959.6);    TECHNIQUE: Frontal and lateral views of the left hip. Frontal view of the pelvis.    COMPARISON: None available.    FINDINGS:  There is no acute fracture or dislocation. Bones are somewhat demineralized. No aggressive lytic or blastic lesion.  No osseous erosion or aggressive past reaction.  Lumbosacral fusion hardware appears intact without evidence of fracture.  There is lucency around the right sacroiliac screw. Joint spaces appear relatively well preserved with normal alignment. No significant joint effusion. Implanted collection device project  over the right pelvis.  Numerous surgical clips noted in the lower pelvis and right hemipelvis.  Scattered vascular calcifications also seen.                                         X-Ray Lumbar Spine Ap And Lateral (Final result)  Result time 01/01/25 13:31:33      Final result by Bjorn Hawley MD (01/01/25 13:31:33)                   Impression:      No acute lumbar spine injury.  Mild loosening at the right sacroiliac screw.  Posterior thoracolumbar fusion hardware is otherwise intact.    Finalized on: 1/1/2025 1:31 PM By:  Bjorn Hawley MD  [facility]# [id number]      2025-01-01 13:33:36.869    [facility]               Narrative:    EXAM: XR LUMBAR SPINE AP AND LATERAL    CLINICAL HISTORY: fall;    TECHNIQUE: Frontal, lateral, and coned down lumbosacral views of the lumbar spine.    COMPARISON: None available.    FINDINGS:  There is no acute fracture or compression deformity. No aggressive lytic or blastic lesion seen.    Posterior thoracolumbar fusion with bilateral sacroiliac fixation hardware appears intact without evidence of fracture.  There is mild lucency about the right sacroiliac screw.  Anterior lumbosacral fusion hardware is also intact without evidence of fracture or loosening.    Alignment is within normal limits.    Intervertebral disc heights are relatively well preserved.    Scattered vascular calcifications seen.  Soft tissues otherwise unremarkable.                                         X-Ray Cervical Spine AP And Lateral (Final result)  Result time 01/01/25 13:28:09      Final result by Bjorn Hawley MD (01/01/25 13:28:09)                   Impression:      No acute cervical spine injury.  Trace anterolisthesis of C4 on C5.  Multilevel degenerative changes as above.    Finalized on: 1/1/2025 1:28 PM By:  Bjorn Hawley MD  [facility]# [id number]      2025-01-01 13:30:16.644    [facility]               Narrative:    EXAM: XR CERVICAL SPINE AP LATERAL    CLINICAL HISTORY:  [W19.XXXA]-Unspecified  fall, initial encounter.    TECHNIQUE: Frontal, lateral, and odontoid views of the cervical spine.    COMPARISON: None available.    FINDINGS:  There is no acute fracture or compression deformity. No aggressive lytic or blastic lesion seen.    There is trace anterolisthesis of C4 on C5.  Remaining alignment is unremarkable.    There is multilevel loss of intervertebral disc height and associated end plate osteophytosis, most prominent at C5-C6 and C6-C7. Multilevel degenerative facet arthropathy also noted, most prominent abdomen to lower cervical levels.    Carotid calcification seen in the neck.  Soft tissues otherwise unremarkable.                                         Medications   HYDROcodone-acetaminophen 5-325 mg per tablet 1 tablet (1 tablet Oral Given 1/1/25 1251)     Medical Decision Making  Differential diagnosis considered but not limited to; lumbar strain, left hip fracture left hip contusion, fall, neck strain,    Amount and/or Complexity of Data Reviewed  Radiology: ordered.    Risk  Prescription drug management.                                      Clinical Impression:  Final diagnoses:  [W19.XXXA] Fall  [W19.XXXA] Fall, initial encounter (Primary)          ED Disposition Condition    Discharge Stable          ED Prescriptions       Medication Sig Dispense Start Date End Date Auth. Provider    HYDROcodone-acetaminophen (NORCO) 7.5-325 mg per tablet Take 1 tablet by mouth every 6 (six) hours as needed. 11 tablet 1/1/2025 1/11/2025 Elian Meehan NP          Follow-up Information       Follow up With Specialties Details Why Contact Info    Bjorn Warner MD Family Medicine Schedule an appointment as soon as possible for a visit  As needed [address] [facility] [address]  [phone]               Elian Meehan NP  01/02/25 2108

## (undated) DEVICE — RELOAD CONTOUR 40MM GREEN

## (undated) DEVICE — GAUZE SPONGE 4X4 12PLY

## (undated) DEVICE — SEE MEDLINE ITEM 157148

## (undated) DEVICE — APPLICATOR CHLORAPREP ORN 26ML

## (undated) DEVICE — SUT SILK 3-0 STRANDS 30IN

## (undated) DEVICE — ELECTRODE BLD EXT 6.50 ST DISP

## (undated) DEVICE — Device

## (undated) DEVICE — SUT 1 48IN PDS II VIO MONO

## (undated) DEVICE — DRESSING XEROFORM 5X9IN

## (undated) DEVICE — SEE MEDLINE ITEM 157027

## (undated) DEVICE — STAPLER CONTOUR 40MM GREEN

## (undated) DEVICE — SUT 1 36IN PDS II VIO MONO

## (undated) DEVICE — SUT 2/0 30IN SILK BLK BRAI

## (undated) DEVICE — SEE MEDLINE ITEM 157117

## (undated) DEVICE — COVER OVERHEAD SURG LT BLUE

## (undated) DEVICE — BAG POSTOP W/ACCESS CUT TO FIT

## (undated) DEVICE — SUT SILK 3-0 SH 18IN BLACK

## (undated) DEVICE — SUT VICRYL 3-0 27 SH

## (undated) DEVICE — SPONGE LAP 18X18 PREWASHED

## (undated) DEVICE — CART STPL RELD CNTOUR BLU

## (undated) DEVICE — STAPLER SKIN PROXIMATE WIDE

## (undated) DEVICE — ELECTRODE REM PLYHSV RETURN 9

## (undated) DEVICE — MANIFOLD 4 PORT

## (undated) DEVICE — SEE MEDLINE ITEM 152622

## (undated) DEVICE — SUT SILK 2-0 STRANDS 30IN

## (undated) DEVICE — DEVICE ENSEAL X1 LARGE JAW

## (undated) DEVICE — GLOVE SURG BIOGEL LATEX SZ 7.5

## (undated) DEVICE — TAPE SURG MEDIPORE 6X72IN